# Patient Record
Sex: FEMALE | Race: WHITE | NOT HISPANIC OR LATINO | Employment: OTHER | URBAN - METROPOLITAN AREA
[De-identification: names, ages, dates, MRNs, and addresses within clinical notes are randomized per-mention and may not be internally consistent; named-entity substitution may affect disease eponyms.]

---

## 2023-04-10 ENCOUNTER — HOSPITAL ENCOUNTER (OUTPATIENT)
Facility: HOSPITAL | Age: 68
Discharge: HOME | End: 2023-04-13
Attending: EMERGENCY MEDICINE | Admitting: STUDENT IN AN ORGANIZED HEALTH CARE EDUCATION/TRAINING PROGRAM
Payer: MEDICARE

## 2023-04-10 DIAGNOSIS — R07.9 CHEST PAIN: ICD-10-CM

## 2023-04-10 DIAGNOSIS — G45.9 TIA (TRANSIENT ISCHEMIC ATTACK): Primary | ICD-10-CM

## 2023-04-10 DIAGNOSIS — R47.01 APHASIA: ICD-10-CM

## 2023-04-10 LAB
ALBUMIN SERPL-MCNC: 3.7 G/DL (ref 3.4–4.8)
ALBUMIN/GLOB SERPL: 1.2 RATIO (ref 1.1–2)
ALP SERPL-CCNC: 74 UNIT/L (ref 40–150)
ALT SERPL-CCNC: 10 UNIT/L (ref 0–55)
APPEARANCE UR: CLEAR
APTT PPP: <20 SECONDS (ref 23.2–33.7)
AST SERPL-CCNC: 10 UNIT/L (ref 5–34)
BACTERIA #/AREA URNS AUTO: ABNORMAL /HPF
BASOPHILS # BLD AUTO: 0.03 X10(3)/MCL (ref 0–0.2)
BASOPHILS NFR BLD AUTO: 0.3 %
BILIRUB UR QL STRIP.AUTO: NEGATIVE MG/DL
BILIRUBIN DIRECT+TOT PNL SERPL-MCNC: 0.5 MG/DL
BUN SERPL-MCNC: 19 MG/DL (ref 9.8–20.1)
CALCIUM SERPL-MCNC: 9.4 MG/DL (ref 8.4–10.2)
CHLORIDE SERPL-SCNC: 101 MMOL/L (ref 98–107)
CO2 SERPL-SCNC: 26 MMOL/L (ref 23–31)
COLOR UR AUTO: YELLOW
CREAT SERPL-MCNC: 1.01 MG/DL (ref 0.55–1.02)
EOSINOPHIL # BLD AUTO: 0.35 X10(3)/MCL (ref 0–0.9)
EOSINOPHIL NFR BLD AUTO: 3.5 %
ERYTHROCYTE [DISTWIDTH] IN BLOOD BY AUTOMATED COUNT: 12.9 % (ref 11.5–17)
GFR SERPLBLD CREATININE-BSD FMLA CKD-EPI: 60 MLS/MIN/1.73/M2
GLOBULIN SER-MCNC: 3.2 GM/DL (ref 2.4–3.5)
GLUCOSE SERPL-MCNC: 385 MG/DL (ref 82–115)
GLUCOSE UR QL STRIP.AUTO: >=1000 MG/DL
HCT VFR BLD AUTO: 41 % (ref 37–47)
HGB BLD-MCNC: 13.4 G/DL (ref 12–16)
IMM GRANULOCYTES # BLD AUTO: 0.03 X10(3)/MCL (ref 0–0.04)
IMM GRANULOCYTES NFR BLD AUTO: 0.3 %
INR BLD: 0.98 (ref 0–1.3)
KETONES UR QL STRIP.AUTO: 15 MG/DL
LEUKOCYTE ESTERASE UR QL STRIP.AUTO: NEGATIVE UNIT/L
LYMPHOCYTES # BLD AUTO: 2.5 X10(3)/MCL (ref 0.6–4.6)
LYMPHOCYTES NFR BLD AUTO: 25.2 %
MCH RBC QN AUTO: 29 PG (ref 27–31)
MCHC RBC AUTO-ENTMCNC: 32.7 G/DL (ref 33–36)
MCV RBC AUTO: 88.7 FL (ref 80–94)
MONOCYTES # BLD AUTO: 0.37 X10(3)/MCL (ref 0.1–1.3)
MONOCYTES NFR BLD AUTO: 3.7 %
NEUTROPHILS # BLD AUTO: 6.64 X10(3)/MCL (ref 2.1–9.2)
NEUTROPHILS NFR BLD AUTO: 67 %
NITRITE UR QL STRIP.AUTO: NEGATIVE
PH UR STRIP.AUTO: 7.5 [PH]
PLATELET # BLD AUTO: 308 X10(3)/MCL (ref 130–400)
PMV BLD AUTO: 8.8 FL (ref 7.4–10.4)
POCT GLUCOSE: 215 MG/DL (ref 70–110)
POCT GLUCOSE: 282 MG/DL (ref 70–110)
POCT GLUCOSE: 317 MG/DL (ref 70–110)
POTASSIUM SERPL-SCNC: 3.5 MMOL/L (ref 3.5–5.1)
PROT SERPL-MCNC: 6.9 GM/DL (ref 5.8–7.6)
PROT UR QL STRIP.AUTO: NEGATIVE MG/DL
PROTHROMBIN TIME: <10 SECONDS (ref 12.5–14.5)
RBC # BLD AUTO: 4.62 X10(6)/MCL (ref 4.2–5.4)
RBC #/AREA URNS AUTO: ABNORMAL /HPF
RBC UR QL AUTO: NEGATIVE UNIT/L
SODIUM SERPL-SCNC: 139 MMOL/L (ref 136–145)
SP GR UR STRIP.AUTO: 1.01
SQUAMOUS #/AREA URNS AUTO: ABNORMAL /HPF
UROBILINOGEN UR STRIP-ACNC: 0.2 MG/DL
WBC # SPEC AUTO: 9.9 X10(3)/MCL (ref 4.5–11.5)
WBC #/AREA URNS AUTO: ABNORMAL /HPF

## 2023-04-10 PROCEDURE — 81003 URINALYSIS AUTO W/O SCOPE: CPT | Performed by: EMERGENCY MEDICINE

## 2023-04-10 PROCEDURE — 94760 N-INVAS EAR/PLS OXIMETRY 1: CPT

## 2023-04-10 PROCEDURE — 80053 COMPREHEN METABOLIC PANEL: CPT | Performed by: EMERGENCY MEDICINE

## 2023-04-10 PROCEDURE — 25000003 PHARM REV CODE 250: Performed by: STUDENT IN AN ORGANIZED HEALTH CARE EDUCATION/TRAINING PROGRAM

## 2023-04-10 PROCEDURE — 85730 THROMBOPLASTIN TIME PARTIAL: CPT | Performed by: EMERGENCY MEDICINE

## 2023-04-10 PROCEDURE — 96372 THER/PROPH/DIAG INJ SC/IM: CPT | Performed by: STUDENT IN AN ORGANIZED HEALTH CARE EDUCATION/TRAINING PROGRAM

## 2023-04-10 PROCEDURE — 81001 URINALYSIS AUTO W/SCOPE: CPT | Performed by: EMERGENCY MEDICINE

## 2023-04-10 PROCEDURE — 93005 ELECTROCARDIOGRAM TRACING: CPT

## 2023-04-10 PROCEDURE — 63600175 PHARM REV CODE 636 W HCPCS: Performed by: STUDENT IN AN ORGANIZED HEALTH CARE EDUCATION/TRAINING PROGRAM

## 2023-04-10 PROCEDURE — 93010 EKG 12-LEAD: ICD-10-PCS | Mod: ,,, | Performed by: INTERNAL MEDICINE

## 2023-04-10 PROCEDURE — 25500020 PHARM REV CODE 255: Performed by: STUDENT IN AN ORGANIZED HEALTH CARE EDUCATION/TRAINING PROGRAM

## 2023-04-10 PROCEDURE — 63600175 PHARM REV CODE 636 W HCPCS: Performed by: EMERGENCY MEDICINE

## 2023-04-10 PROCEDURE — 85025 COMPLETE CBC W/AUTO DIFF WBC: CPT | Performed by: EMERGENCY MEDICINE

## 2023-04-10 PROCEDURE — 82962 GLUCOSE BLOOD TEST: CPT

## 2023-04-10 PROCEDURE — G0378 HOSPITAL OBSERVATION PER HR: HCPCS

## 2023-04-10 PROCEDURE — 96375 TX/PRO/DX INJ NEW DRUG ADDON: CPT

## 2023-04-10 PROCEDURE — 99285 EMERGENCY DEPT VISIT HI MDM: CPT | Mod: 25

## 2023-04-10 PROCEDURE — 93010 ELECTROCARDIOGRAM REPORT: CPT | Mod: ,,, | Performed by: INTERNAL MEDICINE

## 2023-04-10 PROCEDURE — 85610 PROTHROMBIN TIME: CPT | Performed by: EMERGENCY MEDICINE

## 2023-04-10 PROCEDURE — 25000003 PHARM REV CODE 250: Performed by: EMERGENCY MEDICINE

## 2023-04-10 PROCEDURE — 96374 THER/PROPH/DIAG INJ IV PUSH: CPT

## 2023-04-10 RX ORDER — SIMETHICONE 80 MG
1 TABLET,CHEWABLE ORAL 4 TIMES DAILY PRN
Status: DISCONTINUED | OUTPATIENT
Start: 2023-04-10 | End: 2023-04-13 | Stop reason: HOSPADM

## 2023-04-10 RX ORDER — ENOXAPARIN SODIUM 100 MG/ML
40 INJECTION SUBCUTANEOUS EVERY 24 HOURS
Status: DISCONTINUED | OUTPATIENT
Start: 2023-04-10 | End: 2023-04-13 | Stop reason: HOSPADM

## 2023-04-10 RX ORDER — SODIUM CHLORIDE 0.9 % (FLUSH) 0.9 %
10 SYRINGE (ML) INJECTION
Status: DISCONTINUED | OUTPATIENT
Start: 2023-04-10 | End: 2023-04-13 | Stop reason: HOSPADM

## 2023-04-10 RX ORDER — MAG HYDROX/ALUMINUM HYD/SIMETH 200-200-20
30 SUSPENSION, ORAL (FINAL DOSE FORM) ORAL 4 TIMES DAILY PRN
Status: DISCONTINUED | OUTPATIENT
Start: 2023-04-10 | End: 2023-04-13 | Stop reason: HOSPADM

## 2023-04-10 RX ORDER — NALOXONE HCL 0.4 MG/ML
0.02 VIAL (ML) INJECTION
Status: DISCONTINUED | OUTPATIENT
Start: 2023-04-10 | End: 2023-04-13 | Stop reason: HOSPADM

## 2023-04-10 RX ORDER — LINAGLIPTIN 5 MG/1
5 TABLET, FILM COATED ORAL DAILY
COMMUNITY
Start: 2023-03-08

## 2023-04-10 RX ORDER — AMLODIPINE BESYLATE 5 MG/1
5 TABLET ORAL DAILY
COMMUNITY
Start: 2023-03-08

## 2023-04-10 RX ORDER — HYDROCHLOROTHIAZIDE 12.5 MG/1
12.5 TABLET ORAL EVERY MORNING
COMMUNITY
Start: 2023-03-08

## 2023-04-10 RX ORDER — CETIRIZINE HYDROCHLORIDE 10 MG/1
10 TABLET ORAL DAILY
COMMUNITY
Start: 2023-01-11

## 2023-04-10 RX ORDER — PRAVASTATIN SODIUM 40 MG/1
40 TABLET ORAL DAILY
Status: ON HOLD | COMMUNITY
Start: 2023-03-08 | End: 2023-04-13 | Stop reason: HOSPADM

## 2023-04-10 RX ORDER — METOPROLOL TARTRATE 1 MG/ML
5 INJECTION, SOLUTION INTRAVENOUS
Status: COMPLETED | OUTPATIENT
Start: 2023-04-10 | End: 2023-04-10

## 2023-04-10 RX ORDER — ONDANSETRON 2 MG/ML
4 INJECTION INTRAMUSCULAR; INTRAVENOUS
Status: COMPLETED | OUTPATIENT
Start: 2023-04-10 | End: 2023-04-10

## 2023-04-10 RX ORDER — INSULIN ASPART 100 [IU]/ML
0-5 INJECTION, SOLUTION INTRAVENOUS; SUBCUTANEOUS
Status: DISCONTINUED | OUTPATIENT
Start: 2023-04-10 | End: 2023-04-13 | Stop reason: HOSPADM

## 2023-04-10 RX ORDER — ONDANSETRON 4 MG/1
8 TABLET, ORALLY DISINTEGRATING ORAL EVERY 8 HOURS PRN
Status: DISCONTINUED | OUTPATIENT
Start: 2023-04-10 | End: 2023-04-13 | Stop reason: HOSPADM

## 2023-04-10 RX ORDER — GLUCAGON 1 MG
1 KIT INJECTION
Status: DISCONTINUED | OUTPATIENT
Start: 2023-04-10 | End: 2023-04-10

## 2023-04-10 RX ORDER — PRAVASTATIN SODIUM 40 MG/1
40 TABLET ORAL DAILY
Status: DISCONTINUED | OUTPATIENT
Start: 2023-04-10 | End: 2023-04-11

## 2023-04-10 RX ORDER — BISACODYL 10 MG
10 SUPPOSITORY, RECTAL RECTAL DAILY PRN
Status: DISCONTINUED | OUTPATIENT
Start: 2023-04-10 | End: 2023-04-13 | Stop reason: HOSPADM

## 2023-04-10 RX ORDER — ASPIRIN 81 MG/1
81 TABLET ORAL DAILY
Status: DISCONTINUED | OUTPATIENT
Start: 2023-04-11 | End: 2023-04-13 | Stop reason: HOSPADM

## 2023-04-10 RX ORDER — CETIRIZINE HYDROCHLORIDE 10 MG/1
10 TABLET ORAL DAILY
Status: DISCONTINUED | OUTPATIENT
Start: 2023-04-10 | End: 2023-04-13 | Stop reason: HOSPADM

## 2023-04-10 RX ORDER — MORPHINE SULFATE 4 MG/ML
2 INJECTION, SOLUTION INTRAMUSCULAR; INTRAVENOUS EVERY 6 HOURS PRN
Status: DISCONTINUED | OUTPATIENT
Start: 2023-04-10 | End: 2023-04-13 | Stop reason: HOSPADM

## 2023-04-10 RX ORDER — IBUPROFEN 200 MG
16 TABLET ORAL
Status: DISCONTINUED | OUTPATIENT
Start: 2023-04-10 | End: 2023-04-13 | Stop reason: HOSPADM

## 2023-04-10 RX ORDER — ONDANSETRON 2 MG/ML
4 INJECTION INTRAMUSCULAR; INTRAVENOUS EVERY 8 HOURS PRN
Status: DISCONTINUED | OUTPATIENT
Start: 2023-04-10 | End: 2023-04-13 | Stop reason: HOSPADM

## 2023-04-10 RX ORDER — METOPROLOL SUCCINATE 50 MG/1
100 TABLET, EXTENDED RELEASE ORAL 2 TIMES DAILY
Status: DISCONTINUED | OUTPATIENT
Start: 2023-04-10 | End: 2023-04-13 | Stop reason: HOSPADM

## 2023-04-10 RX ORDER — METOPROLOL SUCCINATE 100 MG/1
100 TABLET, EXTENDED RELEASE ORAL 2 TIMES DAILY
COMMUNITY
Start: 2023-03-08

## 2023-04-10 RX ORDER — TALC
9 POWDER (GRAM) TOPICAL NIGHTLY PRN
Status: DISCONTINUED | OUTPATIENT
Start: 2023-04-10 | End: 2023-04-11

## 2023-04-10 RX ORDER — IBUPROFEN 200 MG
24 TABLET ORAL
Status: DISCONTINUED | OUTPATIENT
Start: 2023-04-10 | End: 2023-04-13 | Stop reason: HOSPADM

## 2023-04-10 RX ORDER — IPRATROPIUM BROMIDE AND ALBUTEROL SULFATE 2.5; .5 MG/3ML; MG/3ML
3 SOLUTION RESPIRATORY (INHALATION) EVERY 6 HOURS PRN
Status: DISCONTINUED | OUTPATIENT
Start: 2023-04-10 | End: 2023-04-13 | Stop reason: HOSPADM

## 2023-04-10 RX ORDER — LABETALOL HCL 20 MG/4 ML
10 SYRINGE (ML) INTRAVENOUS 4 TIMES DAILY PRN
Status: DISCONTINUED | OUTPATIENT
Start: 2023-04-10 | End: 2023-04-13 | Stop reason: HOSPADM

## 2023-04-10 RX ORDER — AMLODIPINE BESYLATE 5 MG/1
5 TABLET ORAL DAILY
Status: DISCONTINUED | OUTPATIENT
Start: 2023-04-11 | End: 2023-04-11

## 2023-04-10 RX ORDER — GLIPIZIDE 10 MG/1
10 TABLET ORAL 2 TIMES DAILY
Status: DISCONTINUED | OUTPATIENT
Start: 2023-04-10 | End: 2023-04-13 | Stop reason: HOSPADM

## 2023-04-10 RX ORDER — POLYETHYLENE GLYCOL 3350 17 G/17G
17 POWDER, FOR SOLUTION ORAL 3 TIMES DAILY PRN
Status: DISCONTINUED | OUTPATIENT
Start: 2023-04-10 | End: 2023-04-13 | Stop reason: HOSPADM

## 2023-04-10 RX ORDER — GLIPIZIDE 10 MG/1
10 TABLET ORAL 2 TIMES DAILY
COMMUNITY
Start: 2023-03-08

## 2023-04-10 RX ORDER — HYDROCODONE BITARTRATE AND ACETAMINOPHEN 5; 325 MG/1; MG/1
1 TABLET ORAL EVERY 6 HOURS PRN
Status: DISCONTINUED | OUTPATIENT
Start: 2023-04-10 | End: 2023-04-13 | Stop reason: HOSPADM

## 2023-04-10 RX ORDER — HYDROCHLOROTHIAZIDE 12.5 MG/1
12.5 TABLET ORAL EVERY MORNING
Status: DISCONTINUED | OUTPATIENT
Start: 2023-04-11 | End: 2023-04-12

## 2023-04-10 RX ORDER — ACETAMINOPHEN 325 MG/1
650 TABLET ORAL EVERY 4 HOURS PRN
Status: DISCONTINUED | OUTPATIENT
Start: 2023-04-10 | End: 2023-04-13 | Stop reason: HOSPADM

## 2023-04-10 RX ORDER — AMLODIPINE BESYLATE 5 MG/1
5 TABLET ORAL DAILY
Status: DISCONTINUED | OUTPATIENT
Start: 2023-04-10 | End: 2023-04-10

## 2023-04-10 RX ORDER — LISINOPRIL 2.5 MG/1
5 TABLET ORAL DAILY
Status: DISCONTINUED | OUTPATIENT
Start: 2023-04-10 | End: 2023-04-13 | Stop reason: HOSPADM

## 2023-04-10 RX ORDER — LISINOPRIL 5 MG/1
5 TABLET ORAL DAILY
COMMUNITY
Start: 2023-03-08

## 2023-04-10 RX ADMIN — ENOXAPARIN SODIUM 40 MG: 40 INJECTION SUBCUTANEOUS at 05:04

## 2023-04-10 RX ADMIN — GLIPIZIDE 10 MG: 10 TABLET ORAL at 08:04

## 2023-04-10 RX ADMIN — METOPROLOL TARTRATE 5 MG: 1 INJECTION, SOLUTION INTRAVENOUS at 09:04

## 2023-04-10 RX ADMIN — INSULIN ASPART 3 UNITS: 100 INJECTION, SOLUTION INTRAVENOUS; SUBCUTANEOUS at 05:04

## 2023-04-10 RX ADMIN — LISINOPRIL 5 MG: 2.5 TABLET ORAL at 05:04

## 2023-04-10 RX ADMIN — IOPAMIDOL 100 ML: 755 INJECTION, SOLUTION INTRAVENOUS at 10:04

## 2023-04-10 RX ADMIN — CETIRIZINE HYDROCHLORIDE 10 MG: 10 TABLET, FILM COATED ORAL at 05:04

## 2023-04-10 RX ADMIN — METOPROLOL SUCCINATE 100 MG: 50 TABLET, FILM COATED, EXTENDED RELEASE ORAL at 08:04

## 2023-04-10 RX ADMIN — PRAVASTATIN SODIUM 40 MG: 40 TABLET ORAL at 05:04

## 2023-04-10 RX ADMIN — ONDANSETRON HYDROCHLORIDE 4 MG: 2 SOLUTION INTRAMUSCULAR; INTRAVENOUS at 08:04

## 2023-04-10 NOTE — ED PROVIDER NOTES
Encounter Date: 4/10/2023       History     Chief Complaint   Patient presents with    Fatigue     Expressive aphasia this since last night  L[facility]  9pm  4/9/23     This 68-year-old female was brought to the emergency room after she awoke and found that she was having difficulty speaking.  She states she was normal last night when she went to bed.  She also reports that she had a few episodes of dry heaving.     Review of patient's allergies indicates:  No Known Allergies  Past Medical History:   Diagnosis Date    Diabetes mellitus     Gout, unspecified     Hypertension     Unspecified injury of head, initial encounter      No past surgical history on file.  No family history on file.  Social History     Substance Use Topics    Alcohol use: Never     Review of Systems   Constitutional:  Negative for fever.   HENT:  Negative for sore throat.    Respiratory:  Negative for shortness of breath.    Cardiovascular:  Negative for chest pain.   Gastrointestinal:  Negative for nausea.   Genitourinary:  Negative for dysuria.   Musculoskeletal:  Negative for back pain.   Skin:  Negative for rash.   Neurological:  Positive for speech difficulty. Negative for dizziness, weakness, numbness and headaches.   Hematological:  Does not bruise/bleed easily.     Physical Exam     Initial Vitals   BP Pulse Resp Temp SpO2   04/10/23 0810 04/10/23 0810 04/10/23 0810 04/10/23 0821 04/10/23 0810   (!) 129/90 63 20 97.9 °F (36.6 °C) 98 %      MAP       --                Physical Exam    Nursing note and vitals reviewed.  Constitutional: She appears well-developed and well-nourished.   HENT:   Head: Normocephalic and atraumatic.   Eyes: Conjunctivae and EOM are normal. Pupils are equal, round, and reactive to light.   Neck: Neck supple.   Normal range of motion.  Cardiovascular:  Normal rate, regular rhythm, normal heart sounds and intact distal pulses.           Pulmonary/Chest: Breath sounds normal.   Abdominal: Abdomen is soft. Bowel sounds  are normal.   Musculoskeletal:         General: Normal range of motion.      Cervical back: Normal range of motion and neck supple.     Neurological: She is alert and oriented to person, place, and time. She has normal strength.   Mild expressive aphasia   Skin: Skin is warm and dry. Capillary refill takes less than 2 seconds.   Psychiatric: She has a normal mood and affect. Her behavior is normal. Judgment and thought content normal.       ED Course   Procedures  Labs Reviewed   COMPREHENSIVE METABOLIC PANEL - Abnormal; Notable for the following components:       Result Value    Glucose Level 385 (*)     All other components within normal limits   URINALYSIS, REFLEX TO URINE CULTURE - Abnormal; Notable for the following components:    Glucose, UA >=1000 (*)     Ketones, UA 15 (*)     All other components within normal limits   CBC WITH DIFFERENTIAL - Abnormal; Notable for the following components:    MCHC 32.7 (*)     All other components within normal limits   PROTIME-INR - Abnormal; Notable for the following components:    PT <10.0 (*)     All other components within normal limits   APTT - Abnormal; Notable for the following components:    PTT <20.0 (*)     All other components within normal limits   CBC W/ AUTO DIFFERENTIAL    Narrative:     The following orders were created for panel order CBC auto differential.  Procedure                               Abnormality         Status                     ---------                               -----------         ------                     CBC with Differential[[id number]]        Abnormal            Final result                 Please view results for these tests on the individual orders.   URINALYSIS, MICROSCOPIC          Imaging Results              CT Head Without Contrast (Final result)  Result time 04/10/23 08:20:44      Final result by Juan Meza MD (04/10/23 08:20:44)                   Impression:      No acute intracranial findings.      Electronically  signed by: Juan Meza MD  Date:    04/10/2023  Time:    08:20               Narrative:    EXAMINATION:  CT HEAD WITHOUT CONTRAST    CLINICAL HISTORY:  Dizziness, slurred speech    TECHNIQUE:  Axial CT images were obtained through the head without contrast.  Coronal and sagittal reformations were also performed.  Total DLP 1489.  Automated exposure control utilized.    COMPARISON:  None.    FINDINGS:  No hemorrhage, mass effect or CT evidence of acute cortical infarction.  White-matter hypodensities are nonspecific but likely related to small vessel ischemic disease.  Ventricles and sulci are proportionate.    No abnormal extra-axial fluid collections.    No hyperdense artery or vein.  Intracranial arteries are partially calcified.    No skull fracture or aggressive osseous process.  Visualized paranasal sinuses and mastoid air cells are clear.                                       Medications   ondansetron injection 4 mg (4 mg Intravenous Given 4/10/23 0851)     Medical Decision Making:   Initial Assessment:   69 y/o female awoke to find that she  was having difficulty speaking. She denies any other problems. She has a h/o TBI with speech problems but they had resolved until now. She was last seen normal at 9 PM last night.   Differential Diagnosis:   CVA  Clinical Tests:   Radiological Study: Ordered and Reviewed  ED Management:  As patients time of onset is unknown and her aphasia is mild do not think that TPA is indicated. On return from CT the patients speech has already improved.                        Clinical Impression:   Final diagnoses:  [R47.01] Aphasia  [G45.9] TIA (transient ischemic attack) (Primary)        ED Disposition Condition    Observation Stable                Matteo Hartman MD  04/10/23 0902

## 2023-04-10 NOTE — ED NOTES
MRI notified patient needs MRI as soon as possible. MRI reports patient will not be able to perform test until after lunch. Dr. Hartman notified

## 2023-04-10 NOTE — H&P
[facility]   H&P ADMISSION NOTE      Patient Name: Yris Dunlap  MRN: [id number]  Patient Class: OP- Observation   Admission Date: 4/10/2023   Admitting Physician: VALARIE Service   Attending Physician: Thairy G Reyes, DO  Primary Care Provider: No primary care provider on file.  Face-to-Face encounter date: 04/10/2023     CHIEF COMPLAINT     Chief Complaint   Patient presents with    Fatigue     Expressive aphasia this since last night  LWK  9pm  4/9/23     HISTORY OF PRESENTING ILLNESS   68-year-old female with past medical history of traumatic brain injury, hypertension, diabetes mellitus, hyperlipidemia, gout who presents with complaint of dysarthria this morning.  Family at bedside reports last known normal was last night before she went to bed.  When she woke up patient experienced dizziness, vomiting and marked dysarthria.  At baseline family reports she speaks normally however today she has a staggered speech pattern.  Reportedly per ED physician Neurology was consulted and reported patient could remain at our facility if no large vessel occlusion which was ruled out by CTA.  MRI did not show evidence of acute ischemia.  Dysarthria remains.    At baseline patient is independent for ADLs  PAST MEDICAL HISTORY     Past Medical History:   Diagnosis Date    Diabetes mellitus     Gout, unspecified     Hypertension     Unspecified injury of head, initial encounter        PAST SURGICAL HISTORY   No past surgical history on file.    FAMILY HISTORY   Reviewed and noncontributory to this case    SOCIAL HISTORY     Social History     Socioeconomic History    Marital status: Single   Substance and Sexual Activity    Alcohol use: Never       HOME MEDICATIONS     Prior to Admission medications    Medication Sig Start Date End Date Taking? Authorizing Provider   amLODIPine (NORVASC) 5 MG tablet Take 5 mg by mouth Daily. 3/8/23  Yes Historical Provider   cetirizine (ZYRTEC) 10  MG tablet Take 10 mg by mouth Daily. 1/11/23  Yes Historical Provider   glipiZIDE (GLUCOTROL) 10 MG tablet Take 10 mg by mouth 2 (two) times daily. 3/8/23  Yes Historical Provider   hydroCHLOROthiazide (HYDRODIURIL) 12.5 MG Tab Take 12.5 mg by mouth every morning. 3/8/23  Yes Historical Provider   lisinopriL (PRINIVIL,ZESTRIL) 5 MG tablet Take 5 mg by mouth Daily. 3/8/23  Yes Historical Provider   metoprolol succinate (TOPROL-XL) 100 MG 24 hr tablet Take 100 mg by mouth 2 (two) times daily. 3/8/23  Yes Historical Provider   pravastatin (PRAVACHOL) 40 MG tablet Take 40 mg by mouth Daily. 3/8/23  Yes Historical Provider   TRADJENTA 5 mg Tab tablet Take 5 mg by mouth Daily. 3/8/23  Yes Historical Provider       ALLERGIES   Patient has no known allergies.    REVIEW OF SYSTEMS   Except as documented above, all other systems reviewed and negative    PHYSICAL EXAM     Vitals:    04/10/23 1226   BP: (!) 152/75   Pulse: 80   Resp: 18   Temp: 97.3 °F (36.3 °C)      General:  In no acute distress, resting comfortably  Head and neck:  Atraumatic, normocephalic, moist mucous membranes, supple neck, poor dentition  Chest:  Clear to auscultation bilaterally  Heart:  S1, S2, no appreciable murmur  Abdomen:  Soft, nontender, BS +  MSK:  Warm, no lower extremity edema, no clubbing or cyanosis  Neuro:  Alert and oriented x4, right pronator drift, dysdiadochokinesia, dysarthria  Integumentary:  No obvious skin rash  Psychiatry:  Appropriate mood and affect    ASSESSMENT AND PLAN   TIA  -CT head showed no acute intracranial findings  -CTA showed no high-grade stenosis or major vessel occlusion  -MRI showed evidence of chronic ischemia at the left kylah, chronic microvascular ischemia  -will maintain a permissive hypertension until tomorrow morning and resume her home medications at that point in time  -pending A1c, lipid panel  -patient would benefit from stricter blood glucose control as she was hyperglycemic on admission as high as  386  -PT/OT/ST    Hypertension  -permissive hypertension until tomorrow morning  -reviewed patient's home medications, continue with amlodipine, hydrochlorothiazide, lisinopril, metoprolol with unchanged at this time    Non insulin-dependent diabetes mellitus II  -pending A1c   -continue with glipizide, sitagliptin    Hyperlipidemia   -lipid panel in the morning   -continue pravastatin    Gout  -no acute exacerbation at this time    DVT prophylaxis:  Lovenox  Admit observation status under my care  __________________________________________________________________  LABS/MICRO/MEDS/DIAGNOSTICS     LABS  Recent Labs     04/10/23  0815      K 3.5   CHLORIDE 101   CO2 26   BUN 19.0   CREATININE 1.01   GLUCOSE 385*   CALCIUM 9.4   ALKPHOS 74   AST 10   ALT 10   ALBUMIN 3.7     Recent Labs     04/10/23  0815   WBC 9.9   RBC 4.62   HCT 41.0   MCV 88.7          MICROBIOLOGY  Microbiology Results (last 7 days)       ** No results found for the last 168 hours. **            MEDICATIONS   [START ON 4/11/2023] amLODIPine  5 mg Oral Daily    cetirizine  10 mg Oral Daily    enoxaparin  40 mg Subcutaneous Daily    glipiZIDE  10 mg Oral BID    [START ON 4/11/2023] hydroCHLOROthiazide  12.5 mg Oral QAM    lisinopriL  5 mg Oral Daily    metoprolol succinate  100 mg Oral BID    pravastatin  40 mg Oral Daily    [START ON 4/11/2023] SITagliptin phosphate  100 mg Oral Daily      INFUSIONS      DIAGNOSTIC TESTS  MRI Brain Without Contrast   Final Result      No definite acute intracranial abnormality.  There is a geographic region of T2 shine through left kylah consistent with prior ischemia.  This finding is new compared to the prior exam from 2016.      Moderate chronic microvascular white matter ischemic change         Electronically signed by: Xander Lang MD   Date:    04/10/2023   Time:    14:00      CTA Head and Neck (xpd)   Final Result      No acute abnormality. No high-grade stenosis or major vessel occlusion.          Electronically signed by: Juan Meza MD   Date:    04/10/2023   Time:    11:45      CT Head Without Contrast   Final Result      No acute intracranial findings.         Electronically signed by: Juan Meza MD   Date:    04/10/2023   Time:    08:20      US Carotid Bilateral    (Results Pending)          Patient information was obtained from patient, patient's family, past medical records and ER records.   All diagnosis and differential diagnosis have been reviewed; assessment and plan has been documented. I have personally reviewed the labs and test results that are presently available; I have reviewed the patients medication list. I have reviewed the consulting providers response and recommendations. I have reviewed or attempted to review medical records based upon their availability.  All of the patient's questions have been addressed and answered. Patient's is agreeable to the above stated plan. I will continue to monitor closely and make adjustments to medical management as needed.  This note was created using [employer] voice recognition software that occasionally misinterpreted phrases or words.  Please contact me if any questions may rise regarding documentation to clarify verbiage.        Thairy G Reyes, DO   04/10/2023   Internal Medicine

## 2023-04-10 NOTE — ED NOTES
The daughter is reporting her speech is getting slurred again. Dr. Hartman notified and speaking with patient.

## 2023-04-11 LAB
ANION GAP SERPL CALC-SCNC: 13 MEQ/L
AV MEAN GRADIENT: 5 MMHG
AV PEAK GRADIENT: 10 MMHG
AV VELOCITY RATIO: 0.81
BASOPHILS # BLD AUTO: 0.03 X10(3)/MCL (ref 0–0.2)
BASOPHILS NFR BLD AUTO: 0.2 %
BSA FOR ECHO PROCEDURE: 1.88 M2
BUN SERPL-MCNC: 19 MG/DL (ref 9.8–20.1)
CALCIUM SERPL-MCNC: 9.5 MG/DL (ref 8.4–10.2)
CHLORIDE SERPL-SCNC: 102 MMOL/L (ref 98–107)
CHOLEST SERPL-MCNC: 206 MG/DL
CHOLEST/HDLC SERPL: 6 {RATIO} (ref 0–5)
CO2 SERPL-SCNC: 28 MMOL/L (ref 23–31)
CREAT SERPL-MCNC: 0.84 MG/DL (ref 0.55–1.02)
CREAT/UREA NIT SERPL: 23
CV ECHO LV RWT: 0.38 CM
DOP CALC AO PEAK VEL: 1.6 M/S
DOP CALC AO VTI: 30.3 CM
DOP CALC LVOT AREA: 3 CM2
DOP CALC LVOT DIAMETER: 1.95 CM
DOP CALC LVOT PEAK VEL: 1.3 M/S
E/A RATIO: 0.7
ECHO LV POSTERIOR WALL: 0.9 CM (ref 0.6–1.1)
EJECTION FRACTION: 70 %
EOSINOPHIL # BLD AUTO: 0.02 X10(3)/MCL (ref 0–0.9)
EOSINOPHIL NFR BLD AUTO: 0.2 %
ERYTHROCYTE [DISTWIDTH] IN BLOOD BY AUTOMATED COUNT: 12.8 % (ref 11.5–17)
EST. AVERAGE GLUCOSE BLD GHB EST-MCNC: 197.3 MG/DL
FRACTIONAL SHORTENING: 44 % (ref 28–44)
GFR SERPLBLD CREATININE-BSD FMLA CKD-EPI: >60 MLS/MIN/1.73/M2
GLUCOSE SERPL-MCNC: 226 MG/DL (ref 82–115)
HBA1C MFR BLD: 8.5 %
HCT VFR BLD AUTO: 40.6 % (ref 37–47)
HDLC SERPL-MCNC: 36 MG/DL (ref 35–60)
HGB BLD-MCNC: 13.6 G/DL (ref 12–16)
IMM GRANULOCYTES # BLD AUTO: 0.02 X10(3)/MCL (ref 0–0.04)
IMM GRANULOCYTES NFR BLD AUTO: 0.2 %
INTERVENTRICULAR SEPTUM: 1.1 CM (ref 0.6–1.1)
LDLC SERPL CALC-MCNC: 115 MG/DL (ref 50–140)
LEFT ATRIUM SIZE: 4 CM
LEFT INTERNAL DIMENSION IN SYSTOLE: 2.7 CM (ref 2.1–4)
LEFT VENTRICLE MASS INDEX: 95 G/M2
LEFT VENTRICULAR INTERNAL DIMENSION IN DIASTOLE: 4.8 CM (ref 3.5–6)
LEFT VENTRICULAR MASS: 170.19 G
LVOT MG: 3.6 MMHG
LVOT MV: 3.6 CM/S
LYMPHOCYTES # BLD AUTO: 2.74 X10(3)/MCL (ref 0.6–4.6)
LYMPHOCYTES NFR BLD AUTO: 22.5 %
MCH RBC QN AUTO: 29.6 PG (ref 27–31)
MCHC RBC AUTO-ENTMCNC: 33.5 G/DL (ref 33–36)
MCV RBC AUTO: 88.5 FL (ref 80–94)
MONOCYTES # BLD AUTO: 0.66 X10(3)/MCL (ref 0.1–1.3)
MONOCYTES NFR BLD AUTO: 5.4 %
MV PEAK A VEL: 1.37 M/S
MV PEAK E VEL: 0.96 M/S
NEUTROPHILS # BLD AUTO: 8.71 X10(3)/MCL (ref 2.1–9.2)
NEUTROPHILS NFR BLD AUTO: 71.5 %
PISA TR MAX VEL: 2.78 M/S
PLATELET # BLD AUTO: 346 X10(3)/MCL (ref 130–400)
PMV BLD AUTO: 9.3 FL (ref 7.4–10.4)
POCT GLUCOSE: 218 MG/DL (ref 70–110)
POCT GLUCOSE: 231 MG/DL (ref 70–110)
POCT GLUCOSE: 280 MG/DL (ref 70–110)
POCT GLUCOSE: 308 MG/DL (ref 70–110)
POCT GLUCOSE: 352 MG/DL (ref 70–110)
POTASSIUM SERPL-SCNC: 3.3 MMOL/L (ref 3.5–5.1)
RBC # BLD AUTO: 4.59 X10(6)/MCL (ref 4.2–5.4)
RIGHT VENTRICULAR END-DIASTOLIC DIMENSION: 2.1 CM
SODIUM SERPL-SCNC: 143 MMOL/L (ref 136–145)
TR MAX PG: 31 MMHG
TRIGL SERPL-MCNC: 277 MG/DL (ref 37–140)
VLDLC SERPL CALC-MCNC: 55 MG/DL
WBC # SPEC AUTO: 12.2 X10(3)/MCL (ref 4.5–11.5)

## 2023-04-11 PROCEDURE — 97530 THERAPEUTIC ACTIVITIES: CPT

## 2023-04-11 PROCEDURE — 85025 COMPLETE CBC W/AUTO DIFF WBC: CPT | Performed by: STUDENT IN AN ORGANIZED HEALTH CARE EDUCATION/TRAINING PROGRAM

## 2023-04-11 PROCEDURE — 83036 HEMOGLOBIN GLYCOSYLATED A1C: CPT | Performed by: STUDENT IN AN ORGANIZED HEALTH CARE EDUCATION/TRAINING PROGRAM

## 2023-04-11 PROCEDURE — G0378 HOSPITAL OBSERVATION PER HR: HCPCS

## 2023-04-11 PROCEDURE — 25000003 PHARM REV CODE 250: Performed by: STUDENT IN AN ORGANIZED HEALTH CARE EDUCATION/TRAINING PROGRAM

## 2023-04-11 PROCEDURE — 80048 BASIC METABOLIC PNL TOTAL CA: CPT | Performed by: STUDENT IN AN ORGANIZED HEALTH CARE EDUCATION/TRAINING PROGRAM

## 2023-04-11 PROCEDURE — 51701 INSERT BLADDER CATHETER: CPT

## 2023-04-11 PROCEDURE — 92523 SPEECH SOUND LANG COMPREHEN: CPT

## 2023-04-11 PROCEDURE — 97116 GAIT TRAINING THERAPY: CPT

## 2023-04-11 PROCEDURE — 80061 LIPID PANEL: CPT | Performed by: STUDENT IN AN ORGANIZED HEALTH CARE EDUCATION/TRAINING PROGRAM

## 2023-04-11 PROCEDURE — 94760 N-INVAS EAR/PLS OXIMETRY 1: CPT

## 2023-04-11 PROCEDURE — 92610 EVALUATE SWALLOWING FUNCTION: CPT

## 2023-04-11 PROCEDURE — 25000003 PHARM REV CODE 250: Performed by: INTERNAL MEDICINE

## 2023-04-11 PROCEDURE — 97162 PT EVAL MOD COMPLEX 30 MIN: CPT

## 2023-04-11 PROCEDURE — 96372 THER/PROPH/DIAG INJ SC/IM: CPT | Performed by: STUDENT IN AN ORGANIZED HEALTH CARE EDUCATION/TRAINING PROGRAM

## 2023-04-11 PROCEDURE — 97166 OT EVAL MOD COMPLEX 45 MIN: CPT

## 2023-04-11 PROCEDURE — 63600175 PHARM REV CODE 636 W HCPCS: Performed by: STUDENT IN AN ORGANIZED HEALTH CARE EDUCATION/TRAINING PROGRAM

## 2023-04-11 RX ORDER — CLOPIDOGREL BISULFATE 75 MG/1
75 TABLET ORAL DAILY
Status: DISCONTINUED | OUTPATIENT
Start: 2023-04-11 | End: 2023-04-13 | Stop reason: HOSPADM

## 2023-04-11 RX ORDER — ZOLPIDEM TARTRATE 5 MG/1
5 TABLET ORAL NIGHTLY PRN
Status: DISCONTINUED | OUTPATIENT
Start: 2023-04-12 | End: 2023-04-13 | Stop reason: HOSPADM

## 2023-04-11 RX ORDER — QUETIAPINE FUMARATE 50 MG/1
50 TABLET, FILM COATED ORAL NIGHTLY
Status: DISCONTINUED | OUTPATIENT
Start: 2023-04-11 | End: 2023-04-13 | Stop reason: HOSPADM

## 2023-04-11 RX ORDER — POTASSIUM CHLORIDE 20 MEQ/1
40 TABLET, EXTENDED RELEASE ORAL ONCE
Status: COMPLETED | OUTPATIENT
Start: 2023-04-11 | End: 2023-04-11

## 2023-04-11 RX ORDER — TALC
6 POWDER (GRAM) TOPICAL NIGHTLY
Status: DISCONTINUED | OUTPATIENT
Start: 2023-04-11 | End: 2023-04-13 | Stop reason: HOSPADM

## 2023-04-11 RX ORDER — SERTRALINE HYDROCHLORIDE 25 MG/1
25 TABLET, FILM COATED ORAL DAILY
Status: DISCONTINUED | OUTPATIENT
Start: 2023-04-11 | End: 2023-04-13 | Stop reason: HOSPADM

## 2023-04-11 RX ORDER — ATORVASTATIN CALCIUM 40 MG/1
40 TABLET, FILM COATED ORAL NIGHTLY
Status: DISCONTINUED | OUTPATIENT
Start: 2023-04-11 | End: 2023-04-13 | Stop reason: HOSPADM

## 2023-04-11 RX ADMIN — CLOPIDOGREL BISULFATE 75 MG: 75 TABLET ORAL at 05:04

## 2023-04-11 RX ADMIN — SITAGLIPTIN 100 MG: 50 TABLET, FILM COATED ORAL at 09:04

## 2023-04-11 RX ADMIN — INSULIN ASPART 4 UNITS: 100 INJECTION, SOLUTION INTRAVENOUS; SUBCUTANEOUS at 01:04

## 2023-04-11 RX ADMIN — ATORVASTATIN CALCIUM 40 MG: 40 TABLET, FILM COATED ORAL at 09:04

## 2023-04-11 RX ADMIN — POTASSIUM CHLORIDE 40 MEQ: 1500 TABLET, EXTENDED RELEASE ORAL at 12:04

## 2023-04-11 RX ADMIN — ENOXAPARIN SODIUM 40 MG: 40 INJECTION SUBCUTANEOUS at 05:04

## 2023-04-11 RX ADMIN — METOPROLOL SUCCINATE 100 MG: 50 TABLET, FILM COATED, EXTENDED RELEASE ORAL at 09:04

## 2023-04-11 RX ADMIN — CETIRIZINE HYDROCHLORIDE 10 MG: 10 TABLET, FILM COATED ORAL at 05:04

## 2023-04-11 RX ADMIN — HYDROCHLOROTHIAZIDE 12.5 MG: 12.5 TABLET ORAL at 06:04

## 2023-04-11 RX ADMIN — GLIPIZIDE 10 MG: 10 TABLET ORAL at 09:04

## 2023-04-11 RX ADMIN — INSULIN ASPART 1 UNITS: 100 INJECTION, SOLUTION INTRAVENOUS; SUBCUTANEOUS at 05:04

## 2023-04-11 RX ADMIN — ASPIRIN 81 MG: 81 TABLET, COATED ORAL at 09:04

## 2023-04-11 RX ADMIN — LISINOPRIL 5 MG: 2.5 TABLET ORAL at 05:04

## 2023-04-11 RX ADMIN — QUETIAPINE FUMARATE 50 MG: 50 TABLET ORAL at 11:04

## 2023-04-11 RX ADMIN — SERTRALINE HYDROCHLORIDE 25 MG: 25 TABLET ORAL at 02:04

## 2023-04-11 RX ADMIN — Medication 6 MG: at 09:04

## 2023-04-11 RX ADMIN — INSULIN ASPART 3 UNITS: 100 INJECTION, SOLUTION INTRAVENOUS; SUBCUTANEOUS at 05:04

## 2023-04-11 NOTE — PLAN OF CARE
Problem: Adult Inpatient Plan of Care  Goal: Plan of Care Review  Flowsheets (Taken 4/10/2023 1939)  Plan of Care Reviewed With:   patient   family  Goal: Absence of Hospital-Acquired Illness or Injury  Intervention: Identify and Manage Fall Risk  Flowsheets (Taken 4/10/2023 1939)  Safety Promotion/Fall Prevention:   assistive device/personal item within reach   bed alarm set   medications reviewed   nonskid shoes/socks when out of bed   Fall Risk reviewed with patient/family   family to remain at bedside   instructed to call staff for mobility  Intervention: Prevent Skin Injury  Flowsheets (Taken 4/10/2023 1939)  Body Position:   weight shifting   sitting up in bed   position changed independently  Skin Protection:   adhesive use limited   incontinence pads utilized   tubing/devices free from skin contact  Intervention: Prevent and Manage VTE (Venous Thromboembolism) Risk  Flowsheets (Taken 4/10/2023 1939)  Activity Management:   Ambulated to bathroom - L4   Up to bedside commode - L3   Rolling - L1   Arm raise - L1   Ankle pumps - L1  VTE Prevention/Management:   ambulation promoted   bleeding precations maintained   bleeding risk assessed  Range of Motion: active ROM (range of motion) encouraged  Intervention: Prevent Infection  Flowsheets (Taken 4/10/2023 1939)  Infection Prevention:   cohorting utilized   hand hygiene promoted   single patient room provided     Problem: Fall Injury Risk  Goal: Absence of Fall and Fall-Related Injury  Intervention: Identify and Manage Contributors  Flowsheets (Taken 4/10/2023 1939)  Self-Care Promotion: BADL personal objects within reach  Medication Review/Management: medications reviewed  Intervention: Promote Injury-Free Environment  Flowsheets (Taken 4/10/2023 1939)  Safety Promotion/Fall Prevention:   assistive device/personal item within reach   bed alarm set   medications reviewed   nonskid shoes/socks when out of bed   Fall Risk reviewed with patient/family   family to  remain at bedside   instructed to call staff for mobility

## 2023-04-11 NOTE — PLAN OF CARE
Problem: Adult Inpatient Plan of Care  Goal: Plan of Care Review  Outcome: Ongoing, Progressing  Flowsheets (Taken 4/11/2023 0220)  Plan of Care Reviewed With: patient  Goal: Patient-Specific Goal (Individualized)  Outcome: Ongoing, Progressing  Flowsheets (Taken 4/11/2023 0220)  Anxieties, Fears or Concerns: VERY ANXIOUS AND RESTLESS  Individualized Care Needs: roll belt and monitor frequently until end of shift 7a  Goal: Absence of Hospital-Acquired Illness or Injury  Outcome: Ongoing, Progressing  Intervention: Identify and Manage Fall Risk  Flowsheets (Taken 4/11/2023 0220)  Safety Promotion/Fall Prevention:   assistive device/personal item within reach   bed alarm set   Fall Risk reviewed with patient/family   side rails raised x 3  Intervention: Prevent Skin Injury  Flowsheets (Taken 4/11/2023 0220)  Body Position: position changed independently  Skin Protection:   adhesive use limited   incontinence pads utilized  Intervention: Prevent and Manage VTE (Venous Thromboembolism) Risk  Flowsheets (Taken 4/11/2023 0220)  Activity Management: Ambulated to bathroom - L4  VTE Prevention/Management:   bleeding precations maintained   bleeding risk assessed  Range of Motion: active ROM (range of motion) encouraged  Intervention: Prevent Infection  Flowsheets (Taken 4/11/2023 0220)  Infection Prevention:   environmental surveillance performed   personal protective equipment utilized   rest/sleep promoted   single patient room provided   equipment surfaces disinfected   hand hygiene promoted   visitors restricted/screened

## 2023-04-11 NOTE — PROGRESS NOTES
Inpatient Nutrition Evaluation    Admit Date: 4/10/2023   Total duration of encounter: 1 day    Nutrition Recommendation/Prescription     Continue easy to chew diet. 2. Adjust diet to sugar free drinks.       Nutrition Assessment     Chart Review    Reason Seen: continuous nutrition monitoring, length of stay, and follow-up    Malnutrition Screening Tool Results   Have you recently lost weight without trying?: No  Have you been eating poorly because of a decreased appetite?: No   MST Score: 0     Diagnosis:  TIA    Relevant Medical History: DM, Gout, HTN, unspecified injury  of head initial     Nutrition-Related Medications: glipizide, hydrochlorothiazide, insulin aspart,     Nutrition-Related Labs:     Latest Reference Range & Units 04/11/23 03:11   Sodium 136 - 145 mmol/L 143   Potassium 3.5 - 5.1 mmol/L 3.3 (L)   Chloride 98 - 107 mmol/L 102   CO2 23 - 31 mmol/L 28   Anion Gap mEq/L 13.0   BUN 9.8 - 20.1 mg/dL 19.0   Creatinine 0.55 - 1.02 mg/dL 0.84   BUN/CREAT RATIO  23   eGFR mls/min/1.73/m2 >60   Glucose 82 - 115 mg/dL 226 (H)   Calcium 8.4 - 10.2 mg/dL 9.5   Cholesterol <=200 mg/dL 206 (H)   HDL 35 - 60 mg/dL 36   LDL Cholesterol External 50.00 - 140.00 mg/dL 115.00   Total Cholesterol/HDL Ratio 0 - 5  6 (H)   Triglycerides 37 - 140 mg/dL 277 (H)   Very Low Density Lipoprotein  55   Hemoglobin A1C External <=7.0 % 8.5 (H)   (L): Data is abnormally low  (H): Data is abnormally high  Diet Order: Diet Easy to Chew  Oral Supplement Order: none  Appetite/Oral Intake: good/% of meals  Factors Affecting Nutritional Intake: chewing difficulty  Food/Caodaism/Cultural Preferences: unable to obtain  Food Allergies: none reported       Wound(s):       Comments    Pt intake is good. Discussed food preferences. Adjust drinks. Pt has PMHx-DM.   Marked menus.   Anthropometrics    Height: 5' (152.4 cm) Height Method: Stated  Last Weight: 83.4 kg (183 lb 13.8 oz) (04/10/23 1226) Weight Method: Bed Scale  BMI  (Calculated): 35.9  BMI Classification: obese grade II (BMI 35-39.9)     Ideal Body Weight (IBW), Female: 100 lb     % Ideal Body Weight, Female (lb): 183.87 %                             Usual Weight Provided By: not applicable    Wt Readings from Last 5 Encounters:   04/10/23 83.4 kg (183 lb 13.8 oz)     Weight Change(s) Since Admission:  Admit Weight: 99.8 kg (220 lb) (04/10/23 0810)      Patient Education    Not applicable.    Monitoring & Evaluation     Dietitian will monitor food and beverage intake, weight, weight change, electrolyte/renal panel, glucose/endocrine profile, and gastrointestinal profile.  Nutrition Risk/Follow-Up: low (follow-up in 5-7 days)  Patients assigned 'low nutrition risk' status do not qualify for a full nutritional assessment but will be monitored and re-evaluated in a 5-7 day time period. Please consult if re-evaluation needed sooner.

## 2023-04-11 NOTE — PT/OT/SLP EVAL
Physical Therapy obs Evaluation and Treatment    Patient Name: Yris Dunlap   MRN: [id number]  Recent Surgery: * No surgery found *      Recommendations:     Discharge Recommendations: home, home with home health, home health OT, home health PT   Discharge Equipment Recommendations: none   Barriers to discharge: Increased level of assist, Time since onset    Assessment:     Yris Dunlap is a 68 y.o. female admitted with a medical diagnosis of TIA (transient ischemic attack). She presents with the following impairments/functional limitations: weakness, gait instability, impaired balance, decreased safety awareness.     Rehab Prognosis: Good; patient would benefit from acute PT services to address these deficits and reach maximum level of function.    Plan:     During this hospitalization, patient to be seen 6 x/week to address the above listed problems via gait training, therapeutic activities, therapeutic exercises    Plan of Care Expires: 04/25/23    Subjective     Chief Complaint: wants to go home. Hospital is making her stay in the bed.   Patient Comments/Goals: go home  Pain/Comfort:  Pain Rating 1: 0/10  Pain Rating Post-Intervention 1: 0/10    Social History:  Living Environment: Patient lives with their child(jose) in a single story home with number of outside stair(s): 4 with B rails  Prior Level of Function: Prior to admission, patient was modified independent  Equipment Used at Home: walker, rolling, cane, straight  DME owned (not currently used): none  Assistance Upon Discharge: Family support and HH    Objective:     Communicated with nursing and OT prior to session. Patient found HOB elevated with telemetry, pulse ox (continuous) upon PT entry to room.    General Precautions: Standard, NPO   Orthopedic Precautions:     Braces:      Respiratory Status: Room air    Exams:  Cognitive Exam: Patient is oriented to Person, Place, Time, Situation, follows commands 75% of the time  RLE ROM: WNL  RLE  Strength: WFL, grossly 4/5  LLE ROM: WNL  LLE Strength: WFL, grossly 3/5 and pt would not allow pressure due to pain in knee  Sensation: Intact light touch to BLEs  -     Intact  Coordination: Heel to Shin: abnormal B and Alternating Dorsiflexion/Plantarflexion: abnormal B  Tone:  normal    Functional Mobility:  Gait belt applied - Yes  Bed Mobility  Rolling Left: stand by assistance  Rolling Right: stand by assistance  Supine to Sit: contact guard assistance for trunk management  Sit to Supine: contact guard assistance for LE management  Transfers  Sit to Stand: minimum assistance with rolling walker  Bed to Chair: minimum assistance with rolling walker using Stand Pivot  Gait  Patient ambulated 400 feet with rolling walker and minimum assistance and of 2 persons. Patient demonstrates unsteady gait and ambulates outside VIOLETA of RW. Most losses of balance during R turns. .  Balance:  Static Standing: contact guard assistance with rolling walker  Fair: Patient able to maintain balance with handhold support; may require occasional minimal assistance.  Dynamic Standing: minimum assistance with rolling walker  Poor: Patient unable to accept challenge or move without loss of balance.      Therapeutic Activities and Exercises:  Patient educated on role of acute care PT and PT POC, safety while in hospital including calling nurse for mobility, and call light usage.  Educated about importance of OOB mobility and remaining up in chair most of the day.  Family in room and nursing educated on transfer abilities.     AM-PAC 6 CLICK MOBILITY  Turning over in bed (including adjusting bedclothes, sheets and blankets)?: 4  Sitting down on and standing up from a chair with arms (e.g., wheelchair, bedside commode, etc.): 3  Moving from lying on back to sitting on the side of the bed?: 3  Moving to and from a bed to a chair (including a wheelchair)?: 3  Need to walk in hospital room?: 3  Climbing 3-5 steps with a railing?: 1  Basic  Mobility Total Score: 17     Patient left up in chair with all lines intact, call button in reach, RN notified, chair alarm on, and RN and family present.    GOALS:   Multidisciplinary Problems       Physical Therapy Goals          Problem: Physical Therapy    Goal Priority Disciplines Outcome Goal Variances Interventions   Physical Therapy Goal     PT, PT/OT Ongoing, Progressing     Description: Goals to be met by: Discharge     Patient will increase functional independence with mobility by performin. Sit to stand transfer with Stand-by Assistance  2. Bed to chair transfer with Stand-by Assistance using Rolling Walker  3. Gait  x 100 feet with Stand-by Assistance using Rolling Walker.                          History:     Past Medical History:   Diagnosis Date    Diabetes mellitus     Gout, unspecified     Hypertension     Unspecified injury of head, initial encounter        No past surgical history on file.    Time Tracking:     PT Received On: 23  PT Start Time: 855  PT Stop Time: 935  PT Total Time (min): 40 min     Billable Minutes: Evaluation Moderate Complexity     2023

## 2023-04-11 NOTE — PT/OT/SLP EVAL
Occupational Therapy   Acute Care Evaluation    Name: Yris Dunlap  MRN: [id number]  Admitting Diagnosis:  TIA (transient ischemic attack)      History:   Yris Dunlap is a 68 y.o. female with a medical diagnosis of TIA (transient ischemic attack).    Past Medical History:   Diagnosis Date    Diabetes mellitus     Gout, unspecified     Hypertension     Unspecified injury of head, initial encounter        No past surgical history on file.    Subjective     Chief Complaint: dysarthria  Patient/Family Comments/goals: return to [facility]    Occupational Profile:  Lives with: adult children  Home Environment: [facility] 4 steps to enter c railing  Previous level of function: Mod I for bADLs, assistance for meals and transportation  Equipment Used at Home:  walker, rolling, cane, straight, shower chair, HH shower head      Pain/Comfort:  Pain Rating 1: 0/10    Blood Pressure:     Objective:     Communicated with: NSG and physical therapy prior to session.  Patient found HOB elevated with telemetry, pulse ox (continuous), restraints upon OT entry to room.    General Precautions: Standard, NPO   Orthopedic Precautions:    Braces:    Respiratory Status: Room air    Occupational Performance:    Mobility  Assist level Comments    Bed mobility SBA    Transfer Min A    Functional mobility Min A x2 With use of RW   Sit to stand transitions Min A    Other:          Activities of Daily Living Assist level Comments    Feeding independent    Grooming/hygiene contact guard    Bathing NT    Upper body dressing set up    Lower body dressing set up    Toileting contact guard    Toilet transfer minimal assist    Adaptive equipment training     Other:       Physical Exam:  Upper Extremity Range of Motion:     -       Right Upper Extremity: WFL  -       Left Upper Extremity: WFL  Upper Extremity Strength:    -       Right Upper Extremity: grossly 4-/5  -       Left Upper Extremity: grossly 4/5  Fine Motor Coordination:    -       Impaired   Left hand thumb/finger opposition skills   and Right hand thumb/finger opposition skills    Decreased RUE  strength compared to LUE  Pt right hand dominant    Cognitive/Visual Perceptual:  Cognitive/Psychosocial Skills:     -       Oriented to: Person, Place, Time, and Situation       Treatment & Education:  Pt and family member educate on role of OT, OT POC, and OT goals. Pt educated on need to call for assistance for mobility. Pt and family member verbalized understanding of all OT recommendations.    Patient left up in chair with all lines intact, call button in reach, chair alarm on, and NSG and family present    Assessment:     Yris Dunlap is a 68 y.o. female with a medical diagnosis of TIA (transient ischemic attack). Performance deficits affecting function: weakness, impaired self care skills, impaired functional mobility, impaired balance, decreased safety awareness, impaired fine motor.      Rehab Prognosis: Fair; patient would benefit from acute skilled OT services to address these deficits and reach maximum level of function.       Plan:     Patient to be seen  (QD-BID as appropriate) to address the above listed problems via self-care/home management, therapeutic activities, therapeutic exercises  Plan of Care Reviewed with: patient, grandchild(jose)      GOALS:   Multidisciplinary Problems       Occupational Therapy Goals          Problem: Occupational Therapy    Goal Priority Disciplines Outcome Interventions   Occupational Therapy Goal     OT, PT/OT Ongoing, Progressing    Description: Goals to be met by: 4/14/2023     Patient will increase functional independence with ADLs by performing:    Grooming while standing with Stand-by Assistance.  Toileting from toilet with Supervision for hygiene and clothing management.   Toilet transfer to toilet with Stand-by Assistance.                           Recommendations:     Discharge Recommendations: home with home health  Discharge Equipment  Recommendations:  none      Time Tracking:     OT Date of Treatment: 04/11/23  OT Start Time: 0855  OT Stop Time: 0935  OT Total Time (min): 40 min    Billable Minutes:Evaluation 40    4/11/2023

## 2023-04-11 NOTE — PLAN OF CARE
[facility]  Initial Discharge Assessment       Primary Care Provider: No primary care provider on file.    Admission Diagnosis: Aphasia [R47.01]  TIA (transient ischemic attack) [G45.9]    Admission Date: 4/10/2023  Expected Discharge Date:     Discharge Barriers Identified: None    Payor: [employer] MANAGED MEDICARE / Plan: [employer]ET[employer] MEDICARE DUAL DSNP / Product Type: Medicare Advantage /     Extended Emergency Contact Information  Primary Emergency Contact: flavio graham  Mobile Phone: [phone]  Relation: Daughter  Preferred language: English   needed? No    Discharge Plan A: Home with family, Home Health         [address] [address]  Phone: [phone] Fax: [phone]      Initial Assessment (most recent)       Adult Discharge Assessment - 04/11/23 0758          Discharge Assessment    Assessment Type Discharge Planning Assessment     Confirmed/corrected address, phone number and insurance Yes     Confirmed Demographics Correct on Facesheet     Source of Information family     If unable to respond/provide information was family/caregiver contacted? Yes     Contact Name/Number Beckie Greene friend  [phone]     Does patient/caregiver understand observation status Yes     Communicated ELVIRA with patient/caregiver Date not available/Unable to determine     Reason For Admission TIA     People in Home friend(s)     Facility Arrived From: home     Do you expect to return to your current living situation? Yes     Do you have help at home or someone to help you manage your care at home? Yes     Who are your caregiver(s) and their phone number(s)? Beckie Greene friend  [phone]     Prior to hospitilization cognitive status: Not Oriented to Person     Current cognitive status: Not Oriented to Person     Walking or Climbing Stairs ambulation difficulty, requires equipment     Mobility Management walker     Home  Accessibility wheelchair accessible     Home Layout Able to live on 1st floor     Equipment Currently Used at Home bedside commode     Readmission within 30 days? No     Patient currently being followed by outpatient case management? No     Do you currently have service(s) that help you manage your care at home? No     Do you take prescription medications? Yes     Do you have prescription coverage? Yes     Coverage [employer]     Do you have any problems affording any of your prescribed medications? TBD     Is the patient taking medications as prescribed? yes     Who is going to help you get home at discharge? Beckie Greene friend  [phone]     How do you get to doctors appointments? family or friend will provide     Are you on dialysis? No     Do you take coumadin? No     Discharge Plan A Home with family;Home Health     DME Needed Upon Discharge  walker, standard;wheelchair     Discharge Plan discussed with: Friend     Name(s) and Number(s) Beckie Greene friend  [phone]     Discharge Barriers Identified None        Physical Activity    On average, how many days per week do you engage in moderate to strenuous exercise (like a brisk walk)? 0 days     On average, how many minutes do you engage in exercise at this level? 0 min        Financial Resource Strain    How hard is it for you to pay for the very basics like food, housing, medical care, and heating? Not hard at all        Housing Stability    In the last 12 months, was there a time when you were not able to pay the mortgage or rent on time? No     In the last 12 months, how many places have you lived? 1     In the last 12 months, was there a time when you did not have a steady place to sleep or slept in a shelter (including now)? No        Transportation Needs    In the past 12 months, has lack of transportation kept you from medical appointments or from getting medications? No     In the past 12 months, has lack of transportation kept you from meetings,  work, or from getting things needed for daily living? No        Food Insecurity    Within the past 12 months, you worried that your food would run out before you got the money to buy more. Never true     Within the past 12 months, the food you bought just didn't last and you didn't have money to get more. Never true        Stress    Do you feel stress - tense, restless, nervous, or anxious, or unable to sleep at night because your mind is troubled all the time - these days? Only a little        Social Connections    In a typical week, how many times do you talk on the phone with family, friends, or neighbors? More than three times a week     How often do you get together with friends or relatives? More than three times a week     How often do you attend Amish or Zoroastrian services? More than 4 times per year     Do you belong to any clubs or organizations such as Amish groups, unions, fraternal or athletic groups, or school groups? No     How often do you attend meetings of the clubs or organizations you belong to? 1 to 4 times per year     Are you , , , , never , or living with a partner? Never         Alcohol Use    Q1: How often do you have a drink containing alcohol? Never     Q2: How many drinks containing alcohol do you have on a typical day when you are drinking? Patient does not drink     Q3: How often do you have six or more drinks on one occasion? Never        OTHER    Name(s) of People in Home Beckie Ladit friend  [phone]

## 2023-04-11 NOTE — PLAN OF CARE
Problem: Physical Therapy  Goal: Physical Therapy Goal  Description: Goals to be met by: Discharge     Patient will increase functional independence with mobility by performin. Sit to stand transfer with Stand-by Assistance  2. Bed to chair transfer with Stand-by Assistance using Rolling Walker  3. Gait  x 100 feet with Stand-by Assistance using Rolling Walker.     Outcome: Ongoing, Progressing

## 2023-04-11 NOTE — PLAN OF CARE
Problem: Occupational Therapy  Goal: Occupational Therapy Goal  Description: Goals to be met by: 4/14/2023     Patient will increase functional independence with ADLs by performing:    Grooming while standing with Stand-by Assistance.  Toileting from toilet with Supervision for hygiene and clothing management.   Toilet transfer to toilet with Stand-by Assistance.    Outcome: Ongoing, Progressing

## 2023-04-11 NOTE — PLAN OF CARE
Problem: Adult Inpatient Plan of Care  Goal: Plan of Care Review  Outcome: Ongoing, Progressing  Flowsheets (Taken 4/11/2023 1544)  Plan of Care Reviewed With:   patient   daughter  Goal: Patient-Specific Goal (Individualized)  Outcome: Ongoing, Progressing  Flowsheets (Taken 4/11/2023 1544)  Anxieties, Fears or Concerns: restless, anxious about ambulation  Individualized Care Needs: roll belt, family at bedside to monitor  Goal: Absence of Hospital-Acquired Illness or Injury  Outcome: Ongoing, Progressing  Intervention: Identify and Manage Fall Risk  Flowsheets (Taken 4/11/2023 1544)  Safety Promotion/Fall Prevention:   assistive device/personal item within reach   pulse ox   Roll belt self-releasing   room near unit station   instructed to call staff for mobility   chair alarm set   bedside commode chair   gait belt with ambulation   in recliner, wheels locked   nonskid shoes/socks when out of bed   family to remain at bedside  Intervention: Prevent Skin Injury  Flowsheets (Taken 4/11/2023 1544)  Body Position:   position changed independently   legs elevated   sitting up in bed   weight shifting  Skin Protection:   adhesive use limited   tubing/devices free from skin contact   pulse oximeter probe site changed  Intervention: Prevent and Manage VTE (Venous Thromboembolism) Risk  Flowsheets (Taken 4/11/2023 1544)  Activity Management:   Ambulated in pinon - L4   Up to bedside commode - L3   Up in chair - L3   Standing - L3  VTE Prevention/Management:   ambulation promoted   bleeding precations maintained   bleeding risk assessed   dorsiflexion/plantar flexion performed  Range of Motion: active ROM (range of motion) encouraged  Intervention: Prevent Infection  Flowsheets (Taken 4/11/2023 1544)  Infection Prevention:   cohorting utilized   hand hygiene promoted   single patient room provided  Goal: Optimal Comfort and Wellbeing  Outcome: Ongoing, Progressing  Goal: Readiness for Transition of Care  Outcome: Ongoing,  Progressing     Problem: Bariatric Environmental Safety  Goal: Safety Maintained with Care  Outcome: Ongoing, Progressing     Problem: Infection  Goal: Absence of Infection Signs and Symptoms  Outcome: Ongoing, Progressing     Problem: Fall Injury Risk  Goal: Absence of Fall and Fall-Related Injury  Outcome: Ongoing, Progressing  Intervention: Identify and Manage Contributors  Flowsheets (Taken 4/11/2023 2393)  Self-Care Promotion:   independence encouraged   BADL personal objects within reach  Medication Review/Management: medications reviewed  Intervention: Promote Injury-Free Environment  Flowsheets (Taken 4/11/2023 0309)  Safety Promotion/Fall Prevention:   assistive device/personal item within reach   pulse ox   Roll belt self-releasing   room near unit station   instructed to call staff for mobility   chair alarm set   bedside commode chair   gait belt with ambulation   in recliner, wheels locked   nonskid shoes/socks when out of bed   family to remain at bedside

## 2023-04-11 NOTE — PLAN OF CARE
Problem: SLP  Goal: SLP Goal  Description: LTG:  Pt will tolerate LRD w/o overt s/s of aspiration.  Pt will improve speech intelligibility to 95% at the conversation level.    STG:  Pt will complete speech drills x40 SBA.  Pt will utilize clear speech strategies at the sentence level w/ 90% speech intelligibility.  Pt will tolerate easy to chew diet w/ thin liquids w/ good oral clearance and w/o overt s/s of aspiration.  Outcome: Ongoing, Progressing

## 2023-04-11 NOTE — PT/OT/SLP EVAL
Speech Language Pathology Evaluation  Cognitive/Bedside Swallow    Patient Name:  Yris Dunlap   MRN:  [id number]  Admitting Diagnosis: TIA (transient ischemic attack)    Recommendations:                  General Recommendations:  Dysphagia therapy and Speech/language therapy  Diet recommendations:  Easy to Chew Diet - IDDSI Level 7, Thin liquids - IDDSI Level 0   Aspiration Precautions: 1 bite/sip at a time, Avoid talking while eating, Eliminate distractions, Frequent oral care, HOB to 90 degrees, Small bites/sips, and Standard aspiration precautions   General Precautions: Standard, fall  Communication strategies:  go to room if call light pushed    History:     Past Medical History:   Diagnosis Date    Diabetes mellitus     Gout, unspecified     Hypertension     Unspecified injury of head, initial encounter        No past surgical history on file.    Social History: Patient lives with 3 sons and daughter.    Prior Intubation HX:      Modified Barium Swallow: not warranted at this time.    Chest X-Rays:     Prior diet: NPO    Occupation/hobbies/homemaking: Pt no longer drives. Pt's children cook pt meals and assist w/ medication management. Per pt's granddaughter, the pt was independent prior to TIA.    Subjective     Pt sitting in gerichair following PT and OT eval. Pt's granddaughter present. Pt w/ roll belt in place and call light on lap.    Patient goals: to go home     Pain/Comfort:       Respiratory Status: Room air    Objective:     Cognitive Status:    Orientation Person, Place, and Situation  Memory Immediate Recall WFL, Delayedpt unable to recall 3 words following a 3 minute filled delay despite semantic cueing provided, and long term recall 67% acc  Problem [employer] 100% acc and Compare/contrast 100% acc       Receptive Language:   Comprehension:      Questions Complex yes/no 100% acc  Commands  two step basic commands 100% acc    Expressive Language:  Verbal:    Automatic Speech  Sentence  "completion 100% acc  Sentence formulation pt able to adequately formulate sentences and get messages across, though using "me" consistently instead of using the appropriate "I."        Motor Speech:  Dysarthria moderate  Intelligibility 80% to unfamiliar listener  Articulation imprecise  Initiation impaired    Voice:   WFL    Visual-Spatial:  Did not assess    Reading:   Did not assess      Written Expression:   Did not assess    Oral Musculature Evaluation  Dentition: edentulous  Secretion Management: adequate  Oral Labial Strength and Mobility: WFL    Bedside Swallow Eval:   Consistencies Assessed:  Thin liquids single and sequential cup sips  Puree pudding  Soft solids cookie  Solids karthikeyan cracker      Oral Phase:   Prolonged mastication  Lingual residue    Pharyngeal Phase:   no overt clinical signs/symptoms of pharyngeal dysphagia    Pt also seen w/ whole pills 1 and 2 AAT. Pt demonstrated good tolerance and no difficulty noted.     Compensatory Strategies  None    Treatment: skilled SLP services are warranted to address dysarthria s/p TIA.  Pt's granddaughter stating that pt's speech is far from baseline. Pt's granddaughter also stating that the pt is impulsive since being in the hospital and at home the pt is calm and patient.    Assessment:     Yris Dunlap is a 68 y.o. female with an SLP diagnosis of Dysphagia and Dysarthria.  She presents with edentulous status, therefore was placed on an easy to chew diet. Pt presents w/ dysarthric speech s/p TIA and SLP will focus on speech intelligibility. Pt demonstrates some impulsivity since hospital admit and remains a fall risk.     Goals:   Multidisciplinary Problems       SLP Goals          Problem: SLP    Goal Priority Disciplines Outcome   SLP Goal     SLP Ongoing, Progressing   Description: LTG:  Pt will tolerate LRD w/o overt s/s of aspiration.  Pt will improve speech intelligibility to 95% at the conversation level.    STG:  Pt will complete speech " drills x40 SBA.  Pt will utilize clear speech strategies at the sentence level w/ 90% speech intelligibility.  Pt will tolerate easy to chew diet w/ thin liquids w/ good oral clearance and w/o overt s/s of aspiration.                       Plan:     Patient to be seen:   (PRN)   Plan of Care expires:  04/21/23  Plan of Care reviewed with:  patient, grandchild(jose)   SLP Follow-Up:  Yes       Discharge recommendations:      Barriers to Discharge:  Level of Skilled Assistance Needed see Pt/Ot notes    Time Tracking:     SLP Treatment Date:      Speech Start Time:  0937  Speech Stop Time:  1015     Speech Total Time (min):  38 min    Billable Minutes: Eval 20 speech/language/cognition and 18 swallowing     Chrissie Bourgeois M.S., CCC-SLP   04/11/2023

## 2023-04-11 NOTE — PT/OT/SLP PROGRESS
Physical Therapy Treatment Note           Name: Yris Dunlap    : 1955 (68 y.o.)  MRN: [id number]           TREATMENT SUMMARY AND RECOMMENDATIONS:    PT Received On: 23  PT Start Time: 1530     PT Stop Time: 1600  PT Total Time (min): 30 min     Subjective Assessment:   No complaints  Lethargic   x Awake, alert, cooperative  Uncooperative    Agitated  c/o pain    Appropriate  c/o fatigue    Confused x Treated at bedside     Emotionally labile  Treated in gym/dept.    Impulsive  Other:    Flat affect       Therapy Precautions:    Cognitive deficits  Spinal precautions    Collar - hard  Sternal precautions    Collar - soft   TLSO   x Fall risk  LSO    Hip precautions - posterior  Knee immobilizer    Hip precautions - anterior  WBAT    Impaired communication  Partial weightbearing    Oxygen  TTWB    PEG tube  NWB    Visual deficits  Other:    Hearing deficits          Treatment Objectives:     Mobility Training:   Assist level Comments    Bed mobility     Transfer MIN A Stand pivot using RW   Gait MIN A  2 x 140 feet using RW and WC following   Sit to stand transitions MIN A From recliner, toilet and WC   Sitting balance     Standing balance  Poor MIN A needed to maintain balance and reduce R lateral lean during bathroom tasks.    Wheelchair mobility     Car transfer     Other:          Therapeutic Exercise:   Exercise Sets Reps Comments                               Additional Comments:  Pt had 2 losses of balance towards the R during standing tasks, needing PT assistance for recovery. Reduced  on R impacting RW negotiation. PT ok'd family to bring pt outside in WC. PT to progress as able.     Assessment: Patient tolerated session better than AM.    PT Plan: continue per POC  Revisions made to plan of care: No    GOALS:   Multidisciplinary Problems       Physical Therapy Goals          Problem: Physical Therapy    Goal Priority Disciplines Outcome Goal Variances Interventions   Physical  Therapy Goal     PT, PT/OT Ongoing, Progressing     Description: Goals to be met by: Discharge     Patient will increase functional independence with mobility by performin. Sit to stand transfer with Stand-by Assistance  2. Bed to chair transfer with Stand-by Assistance using Rolling Walker  3. Gait  x 100 feet with Stand-by Assistance using Rolling Walker.                          Skilled PT Minutes Provided: 25   Communication with Treatment Team:     Equipment recommendations:       At end of treatment, patient remained:   Up in chair    x Up in wheelchair in room    In bed    With alarm activated    Bed rails up    Call bell in reach    x Family/friends present    Restraints secured properly    In bathroom with CNA/RN notified   x Nurse aware    In gym with therapist/tech    Other:

## 2023-04-12 LAB
ANION GAP SERPL CALC-SCNC: 8 MEQ/L
APPEARANCE UR: CLEAR
BACTERIA #/AREA URNS AUTO: ABNORMAL /HPF
BASOPHILS # BLD AUTO: 0.04 X10(3)/MCL (ref 0–0.2)
BASOPHILS NFR BLD AUTO: 0.4 %
BILIRUB UR QL STRIP.AUTO: NEGATIVE MG/DL
BUN SERPL-MCNC: 24.9 MG/DL (ref 9.8–20.1)
CALCIUM SERPL-MCNC: 9 MG/DL (ref 8.4–10.2)
CHLORIDE SERPL-SCNC: 103 MMOL/L (ref 98–107)
CO2 SERPL-SCNC: 28 MMOL/L (ref 23–31)
COLOR UR AUTO: YELLOW
CREAT SERPL-MCNC: 0.77 MG/DL (ref 0.55–1.02)
CREAT/UREA NIT SERPL: 32
EOSINOPHIL # BLD AUTO: 0.21 X10(3)/MCL (ref 0–0.9)
EOSINOPHIL NFR BLD AUTO: 2.2 %
ERYTHROCYTE [DISTWIDTH] IN BLOOD BY AUTOMATED COUNT: 13 % (ref 11.5–17)
GFR SERPLBLD CREATININE-BSD FMLA CKD-EPI: >60 MLS/MIN/1.73/M2
GLUCOSE SERPL-MCNC: 226 MG/DL (ref 82–115)
GLUCOSE UR QL STRIP.AUTO: 500 MG/DL
HCT VFR BLD AUTO: 38.1 % (ref 37–47)
HGB BLD-MCNC: 12.7 G/DL (ref 12–16)
IMM GRANULOCYTES # BLD AUTO: 0.04 X10(3)/MCL (ref 0–0.04)
IMM GRANULOCYTES NFR BLD AUTO: 0.4 %
KETONES UR QL STRIP.AUTO: NEGATIVE MG/DL
LEUKOCYTE ESTERASE UR QL STRIP.AUTO: NEGATIVE UNIT/L
LYMPHOCYTES # BLD AUTO: 3.44 X10(3)/MCL (ref 0.6–4.6)
LYMPHOCYTES NFR BLD AUTO: 36.1 %
MCH RBC QN AUTO: 29.6 PG (ref 27–31)
MCHC RBC AUTO-ENTMCNC: 33.3 G/DL (ref 33–36)
MCV RBC AUTO: 88.8 FL (ref 80–94)
MONOCYTES # BLD AUTO: 0.58 X10(3)/MCL (ref 0.1–1.3)
MONOCYTES NFR BLD AUTO: 6.1 %
NEUTROPHILS # BLD AUTO: 5.21 X10(3)/MCL (ref 2.1–9.2)
NEUTROPHILS NFR BLD AUTO: 54.8 %
NITRITE UR QL STRIP.AUTO: NEGATIVE
PH UR STRIP.AUTO: 5.5 [PH]
PLATELET # BLD AUTO: 297 X10(3)/MCL (ref 130–400)
PMV BLD AUTO: 9.1 FL (ref 7.4–10.4)
POCT GLUCOSE: 138 MG/DL (ref 70–110)
POCT GLUCOSE: 196 MG/DL (ref 70–110)
POCT GLUCOSE: 239 MG/DL (ref 70–110)
POTASSIUM SERPL-SCNC: 3.3 MMOL/L (ref 3.5–5.1)
PROT UR QL STRIP.AUTO: 30 MG/DL
RBC # BLD AUTO: 4.29 X10(6)/MCL (ref 4.2–5.4)
RBC #/AREA URNS AUTO: ABNORMAL /HPF
RBC UR QL AUTO: ABNORMAL UNIT/L
SODIUM SERPL-SCNC: 139 MMOL/L (ref 136–145)
SP GR UR STRIP.AUTO: 1.02
SQUAMOUS #/AREA URNS AUTO: ABNORMAL /HPF
UROBILINOGEN UR STRIP-ACNC: 1 MG/DL
WBC # SPEC AUTO: 9.5 X10(3)/MCL (ref 4.5–11.5)
WBC #/AREA URNS AUTO: ABNORMAL /HPF

## 2023-04-12 PROCEDURE — 85025 COMPLETE CBC W/AUTO DIFF WBC: CPT | Performed by: INTERNAL MEDICINE

## 2023-04-12 PROCEDURE — 94760 N-INVAS EAR/PLS OXIMETRY 1: CPT

## 2023-04-12 PROCEDURE — 80048 BASIC METABOLIC PNL TOTAL CA: CPT | Performed by: INTERNAL MEDICINE

## 2023-04-12 PROCEDURE — 63600175 PHARM REV CODE 636 W HCPCS: Performed by: STUDENT IN AN ORGANIZED HEALTH CARE EDUCATION/TRAINING PROGRAM

## 2023-04-12 PROCEDURE — 51701 INSERT BLADDER CATHETER: CPT

## 2023-04-12 PROCEDURE — 96372 THER/PROPH/DIAG INJ SC/IM: CPT | Performed by: STUDENT IN AN ORGANIZED HEALTH CARE EDUCATION/TRAINING PROGRAM

## 2023-04-12 PROCEDURE — 97116 GAIT TRAINING THERAPY: CPT | Mod: CQ

## 2023-04-12 PROCEDURE — G0378 HOSPITAL OBSERVATION PER HR: HCPCS

## 2023-04-12 PROCEDURE — 97530 THERAPEUTIC ACTIVITIES: CPT

## 2023-04-12 PROCEDURE — 25000003 PHARM REV CODE 250: Performed by: STUDENT IN AN ORGANIZED HEALTH CARE EDUCATION/TRAINING PROGRAM

## 2023-04-12 PROCEDURE — 25000003 PHARM REV CODE 250: Performed by: INTERNAL MEDICINE

## 2023-04-12 PROCEDURE — 81001 URINALYSIS AUTO W/SCOPE: CPT | Performed by: INTERNAL MEDICINE

## 2023-04-12 PROCEDURE — 51798 US URINE CAPACITY MEASURE: CPT

## 2023-04-12 PROCEDURE — 92507 TX SP LANG VOICE COMM INDIV: CPT | Mod: 59

## 2023-04-12 PROCEDURE — 97110 THERAPEUTIC EXERCISES: CPT | Mod: 59

## 2023-04-12 PROCEDURE — 97110 THERAPEUTIC EXERCISES: CPT | Mod: CQ

## 2023-04-12 PROCEDURE — 97112 NEUROMUSCULAR REEDUCATION: CPT

## 2023-04-12 RX ORDER — POTASSIUM CHLORIDE 20 MEQ/1
40 TABLET, EXTENDED RELEASE ORAL
Status: COMPLETED | OUTPATIENT
Start: 2023-04-12 | End: 2023-04-12

## 2023-04-12 RX ADMIN — Medication 6 MG: at 08:04

## 2023-04-12 RX ADMIN — SERTRALINE HYDROCHLORIDE 25 MG: 25 TABLET ORAL at 08:04

## 2023-04-12 RX ADMIN — SITAGLIPTIN 100 MG: 50 TABLET, FILM COATED ORAL at 08:04

## 2023-04-12 RX ADMIN — LISINOPRIL 5 MG: 2.5 TABLET ORAL at 04:04

## 2023-04-12 RX ADMIN — INSULIN ASPART 1 UNITS: 100 INJECTION, SOLUTION INTRAVENOUS; SUBCUTANEOUS at 08:04

## 2023-04-12 RX ADMIN — CETIRIZINE HYDROCHLORIDE 10 MG: 10 TABLET, FILM COATED ORAL at 04:04

## 2023-04-12 RX ADMIN — POTASSIUM CHLORIDE 40 MEQ: 1500 TABLET, EXTENDED RELEASE ORAL at 08:04

## 2023-04-12 RX ADMIN — ASPIRIN 81 MG: 81 TABLET, COATED ORAL at 08:04

## 2023-04-12 RX ADMIN — ATORVASTATIN CALCIUM 40 MG: 40 TABLET, FILM COATED ORAL at 08:04

## 2023-04-12 RX ADMIN — GLIPIZIDE 10 MG: 10 TABLET ORAL at 08:04

## 2023-04-12 RX ADMIN — METOPROLOL SUCCINATE 100 MG: 50 TABLET, FILM COATED, EXTENDED RELEASE ORAL at 08:04

## 2023-04-12 RX ADMIN — CLOPIDOGREL BISULFATE 75 MG: 75 TABLET ORAL at 08:04

## 2023-04-12 RX ADMIN — POTASSIUM CHLORIDE 40 MEQ: 1500 TABLET, EXTENDED RELEASE ORAL at 04:04

## 2023-04-12 RX ADMIN — INSULIN ASPART 2 UNITS: 100 INJECTION, SOLUTION INTRAVENOUS; SUBCUTANEOUS at 11:04

## 2023-04-12 RX ADMIN — HYDROCHLOROTHIAZIDE 12.5 MG: 12.5 TABLET ORAL at 06:04

## 2023-04-12 RX ADMIN — QUETIAPINE FUMARATE 50 MG: 50 TABLET ORAL at 08:04

## 2023-04-12 RX ADMIN — ENOXAPARIN SODIUM 40 MG: 40 INJECTION SUBCUTANEOUS at 04:04

## 2023-04-12 NOTE — PLAN OF CARE
Problem: Fall Injury Risk  Goal: Absence of Fall and Fall-Related Injury  4/11/2023 1955 by Daphney Hargrove RN  Outcome: Ongoing, Progressing  4/11/2023 1954 by Daphney Hargrove RN  Outcome: Ongoing, Progressing  Intervention: Identify and Manage Contributors  4/11/2023 1955 by Daphney Hargrove RN  Flowsheets (Taken 4/11/2023 1955)  Self-Care Promotion:   independence encouraged   BADL personal objects within reach   BADL personal routines maintained  Medication Review/Management: medications reviewed  4/11/2023 1954 by Daphney Hargrove RN  Flowsheets (Taken 4/11/2023 1954)  Self-Care Promotion:   independence encouraged   BADL personal objects within reach   BADL personal routines maintained  Medication Review/Management: medications reviewed  Intervention: Promote Injury-Free Environment  4/11/2023 1955 by Daphney Hargrove RN  Flowsheets (Taken 4/11/2023 1955)  Safety Promotion/Fall Prevention:   assistive device/personal item within reach   chair alarm set   gait belt with ambulation  4/11/2023 1954 by Daphney Hargrove RN  Flowsheets (Taken 4/11/2023 1954)  Safety Promotion/Fall Prevention:   assistive device/personal item within reach   chair alarm set   Roll belt self-releasing     Problem: Hyperglycemia  Goal: Blood Glucose Level Within Targeted Range  4/11/2023 1955 by Daphney Hargrove RN  Outcome: Ongoing, Progressing  4/11/2023 1954 by Daphney Hargrove RN  Outcome: Ongoing, Progressing  Intervention: Optimize Glycemic Control  Flowsheets (Taken 4/11/2023 1955)  Glycemic Management: blood glucose monitored

## 2023-04-12 NOTE — PROGRESS NOTES
[facility] HOSPITAL MEDICINE  PROGRESS NOTE        CHIEF COMPLAINT   Hospital follow up    HOSPITAL COURSE   68-year-old female with past medical history of traumatic brain injury, hypertension, diabetes mellitus, hyperlipidemia, gout who presents with complaint of dysarthria this morning.  Family at bedside reports last known normal was last night before she went to bed.  When she woke up patient experienced dizziness, vomiting and marked dysarthria.  At baseline family reports she speaks normally however today she has a staggered speech pattern.  Reportedly per ED physician Neurology was consulted and reported patient could remain at our facility if no large vessel occlusion which was ruled out by CTA.  MRI did not show evidence of acute ischemia.  Dysarthria remains.   At baseline patient is independent for ADLs  Repeat Mri brain was performed 4/11/2024 which did indeed show pontine left paramedian large acute nonhemorrhagic infarct.  She is aspirin naive at home.      Today  Seen and examined this morning.  She is doing better working with therapy today.  Had difficult time getting to sleep last night was better after getting seroquel.  Updated her and daughter regarding results of MRI.         OBJECTIVE/PHYSICAL EXAM     VITAL SIGNS (MOST RECENT):  Temp: 98 °F (36.7 °C) (04/12/23 0754)  Pulse: 73 (04/12/23 0754)  Resp: 18 (04/12/23 0750)  BP: 137/68 (04/12/23 0754)  SpO2: 96 % (04/12/23 0754)   VITAL SIGNS (24 HOUR RANGE):  Temp:  [97.3 °F (36.3 °C)-98.4 °F (36.9 °C)] 98 °F (36.7 °C)  Pulse:  [64-84] 73  Resp:  [12-20] 18  SpO2:  [92 %-97 %] 96 %  BP: (120-158)/(68-85) 137/68   GENERAL: In no acute distress, afebrile  HEENT:  CHEST: Clear to auscultation bilaterally  HEART: S1, S2, no appreciable murmur  ABDOMEN: Soft, nontender, BS +  MSK: Warm, no lower extremity edema, no clubbing or cyanosis  NEUROLOGIC: Alert and oriented x4, moving all extremities with good strength, expressive  aphasia, no facial droop, tongue deviates slightly to the right   INTEGUMENTARY:  PSYCHIATRY:        ASSESSMENT/PLAN   Acute ischemic CVA-large left paramedian pontine  -Neurocheck every 4 hours for today and can reduce frequency tomorrow.    She is aspirin naive, needs asa and plavix x 21 days followed by asa alone.  Atorvastatin 40mg daily.    -PT OT ST eval and treatment today    Essential Hypertension  -continue lisinopril and metoprolol.  Up titrate lisinopril if needed.     Hyperlipidemia  -, goal will be less than 70  -Change pravastatin to atorvastatin (high intensity statin)    Diabetes Mellitus non insulin dependent type 2  -Continue glipizide and Januvia.  Add metformin.  A1c 8.5    Insomnia  -Melatonin and seroquel    Generalized anxiety disorder  -Started on sertraline       DVT prophylaxis:  Lovenox 40    Anticipated discharge and disposition:   __________________________________________________________________________    LABS/MICRO/MEDS/DIAGNOSTICS       LABS  Recent Labs     04/10/23  0815 04/11/23  0311 04/12/23  0603      < > 139   K 3.5   < > 3.3*   CHLORIDE 101   < > 103   CO2 26   < > 28   BUN 19.0   < > 24.9*   CREATININE 1.01   < > 0.77   GLUCOSE 385*   < > 226*   CALCIUM 9.4   < > 9.0   ALKPHOS 74  --   --    AST 10  --   --    ALT 10  --   --    ALBUMIN 3.7  --   --     < > = values in this interval not displayed.       Recent Labs     04/12/23  0603   WBC 9.5   RBC 4.29   HCT 38.1   MCV 88.8            MICROBIOLOGY  Microbiology Results (last 7 days)       ** No results found for the last 168 hours. **               MEDICATIONS   aspirin  81 mg Oral Daily    atorvastatin  40 mg Oral QHS    cetirizine  10 mg Oral Daily    clopidogreL  75 mg Oral Daily    enoxaparin  40 mg Subcutaneous Daily    glipiZIDE  10 mg Oral BID    hydroCHLOROthiazide  12.5 mg Oral QAM    lisinopriL  5 mg Oral Daily    melatonin  6 mg Oral Nightly    metoprolol succinate  100 mg Oral BID     potassium chloride  40 mEq Oral Q8H    QUEtiapine  50 mg Oral QHS    sertraline  25 mg Oral Daily    SITagliptin phosphate  100 mg Oral Daily         INFUSIONS         DIAGNOSTIC TESTS  MRI Brain Without Contrast   Final Result      1.  Pontine left paramedian large acute nonhemorrhagic infarct.      2.  Chronic microangiopathic ischemia and atrophy.         Electronically signed by: Fazal Melgar   Date:    04/11/2023   Time:    15:42      US Carotid Bilateral   Final Result      Mild atherosclerotic disease.  Less than 50% bilateral internal carotid stenosis by velocity criteria.         Electronically signed by: Anival Gold   Date:    04/10/2023   Time:    16:04      MRI Brain Without Contrast   Final Result      No definite acute intracranial abnormality.  There is a geographic region of T2 shine through left kylah consistent with prior ischemia.  This finding is new compared to the prior exam from 2016.      Moderate chronic microvascular white matter ischemic change         Electronically signed by: Xander Lang MD   Date:    04/10/2023   Time:    14:00      CTA Head and Neck (xpd)   Final Result      No acute abnormality. No high-grade stenosis or major vessel occlusion.         Electronically signed by: Juan Meza MD   Date:    04/10/2023   Time:    11:45      CT Head Without Contrast   Final Result      No acute intracranial findings.         Electronically signed by: Juan Meza MD   Date:    04/10/2023   Time:    08:20           EF   Date Value Ref Range Status   04/10/2023 70 % Final              Case related differential diagnoses have been reviewed; assessment and plan has been documented. I have personally reviewed the labs and test results that are currently available; I have reviewed the patients medication list. I have reviewed the consulting providers recommendations. I have reviewed or attempted to review medical records based upon their availability.  All of the patient's and/or family's  questions have been addressed and answered to the best of my ability.  I will continue to monitor closely and make adjustments to medical management as needed.  This document was created using M*Modal Fluency Direct.  Transcription errors may have been made.  Please contact me if any questions may rise regarding documentation to clarify transcription.        Jimbo Swanson MD   04/12/2023   Internal Medicine

## 2023-04-12 NOTE — PT/OT/SLP PROGRESS
Physical Therapy Treatment Note           Name: Yris Dunlap    : 1955 (68 y.o.)  MRN: [id number]           TREATMENT SUMMARY AND RECOMMENDATIONS:    PT Received On: 23  PT Start Time: 1400     PT Stop Time: 1438  PT Total Time (min): 38 min     Subjective Assessment:   No complaints  Lethargic   x Awake, alert, cooperative  Uncooperative    Agitated  c/o pain    Appropriate  c/o fatigue    Confused  Treated at bedside     Emotionally labile x Treated in gym/dept.    Impulsive  Other:    Flat affect       Therapy Precautions:    Cognitive deficits  Spinal precautions    Collar - hard  Sternal precautions    Collar - soft   TLSO   x Fall risk  LSO    Hip precautions - posterior  Knee immobilizer    Hip precautions - anterior  WBAT    Impaired communication  Partial weightbearing    Oxygen  TTWB    PEG tube  NWB    Visual deficits  Other:    Hearing deficits          Treatment Objectives:     Mobility Training:   Assist level Comments    Bed mobility MIN A Rolling R and supine>sit   Transfer MIN A Stand pivot using RW   Gait MIN A  2 x 140 feet using RW and WC following   Sit to stand transitions CGA From WC x 10 reps   Sitting balance Fair+ B UE reaches and isometrics completed for core stability work. Wb'ing thorugh R UE   Standing balance  Fair- CGA for dynamic standing tasks including wt shifting and reaching. No losses in balance demonstrated.    Wheelchair mobility     Car transfer     Other:          Therapeutic Exercise:   Exercise Sets Reps Comments   Seated B LE modified leg press  20 Against PT resistance   Seated B LE LAQ with hold at end range  20                    Additional Comments:  Pt with cues still needed for R UE  on RW, impacting assistance levels needed. Pt progressing with overall balance and tolerance. PT to progress as able.   Assessment: Patient tolerated session well    PT Plan: continue per POC  Revisions made to plan of care: No    GOALS:    Multidisciplinary Problems       Physical Therapy Goals          Problem: Physical Therapy    Goal Priority Disciplines Outcome Goal Variances Interventions   Physical Therapy Goal     PT, PT/OT Ongoing, Progressing     Description: Goals to be met by: Discharge     Patient will increase functional independence with mobility by performin. Sit to stand transfer with Stand-by Assistance  2. Bed to chair transfer with Stand-by Assistance using Rolling Walker  3. Gait  x 100 feet with Stand-by Assistance using Rolling Walker.                          Skilled PT Minutes Provided: 38   Communication with Treatment Team:     Equipment recommendations:       At end of treatment, patient remained:  x Up in chair     Up in wheelchair in room    In bed    With alarm activated    Bed rails up    Call bell in reach     Family/friends present    Restraints secured properly    In bathroom with CNA/RN notified    Nurse aware   x In gym with therapist/tech    Other:

## 2023-04-12 NOTE — PLAN OF CARE
Problem: Adult Inpatient Plan of Care  Goal: Plan of Care Review  Outcome: Ongoing, Progressing  Flowsheets (Taken 4/12/2023 1209)  Plan of Care Reviewed With:   patient   daughter  Goal: Absence of Hospital-Acquired Illness or Injury  Outcome: Ongoing, Progressing  Intervention: Identify and Manage Fall Risk  Flowsheets (Taken 4/12/2023 1209)  Safety Promotion/Fall Prevention:   assistive device/personal item within reach   instructed to call staff for mobility   bed alarm refused   gait belt with ambulation   lighting adjusted   medications reviewed   nonskid shoes/socks when out of bed   family to remain at bedside  Intervention: Prevent Skin Injury  Flowsheets (Taken 4/12/2023 1209)  Body Position: position changed independently  Skin Protection: protective footwear used  Intervention: Prevent and Manage VTE (Venous Thromboembolism) Risk  Flowsheets (Taken 4/12/2023 1209)  Activity Management: Up in chair - L3  VTE Prevention/Management:   bleeding precations maintained   ambulation promoted   bleeding risk assessed   fluids promoted  Range of Motion: active ROM (range of motion) encouraged  Intervention: Prevent Infection  Flowsheets (Taken 4/12/2023 1209)  Infection Prevention:   cohorting utilized   personal protective equipment utilized   environmental surveillance performed   equipment surfaces disinfected   rest/sleep promoted   single patient room provided   hand hygiene promoted     Problem: Fall Injury Risk  Goal: Absence of Fall and Fall-Related Injury  Outcome: Ongoing, Progressing  Intervention: Identify and Manage Contributors  Flowsheets (Taken 4/12/2023 1209)  Self-Care Promotion:   independence encouraged   BADL personal objects within reach   BADL personal routines maintained   meal set-up provided   safe use of adaptive equipment encouraged  Medication Review/Management:   medications reviewed   high-risk medications identified  Intervention: Promote Injury-Free Environment  Flowsheets (Taken  4/12/2023 1209)  Safety Promotion/Fall Prevention:   assistive device/personal item within reach   instructed to call staff for mobility   bed alarm refused   gait belt with ambulation   lighting adjusted   medications reviewed   nonskid shoes/socks when out of bed   family to remain at bedside     Problem: Hyperglycemia  Goal: Blood Glucose Level Within Targeted Range  Outcome: Ongoing, Progressing  Intervention: Optimize Glycemic Control  Flowsheets (Taken 4/12/2023 1209)  Glycemic Management:   blood glucose monitored   supplemental insulin given

## 2023-04-12 NOTE — PT/OT/SLP PROGRESS
Physical Therapy Treatment Note           Name: Yris Dunlap    : 1955 (68 y.o.)  MRN: [id number]           TREATMENT SUMMARY AND RECOMMENDATIONS:    PT Received On: 23  PT Start Time: 1030     PT Stop Time: 1055  PT Total Time (min): 25 min     Subjective Assessment:  x No complaints  Lethargic    Awake, alert, cooperative  Uncooperative    Agitated  c/o pain    Appropriate  c/o fatigue    Confused  Treated at bedside     Emotionally labile x Treated in gym/dept.    Impulsive  Other:    Flat affect       Therapy Precautions:    Cognitive deficits  Spinal precautions    Collar - hard  Sternal precautions    Collar - soft   TLSO   x Fall risk  LSO    Hip precautions - posterior  Knee immobilizer    Hip precautions - anterior  WBAT    Impaired communication  Partial weightbearing    Oxygen  TTWB    PEG tube  NWB    Visual deficits  Other:    Hearing deficits          Treatment Objectives:     Mobility Training:   Assist level Comments    Bed mobility     Transfer     Gait Laura Amb on firm surface with RW 30 ft    Sit to stand transitions Laura Sit-stand 5 reps x 2 with B UE use   Sitting balance     Standing balance      Wheelchair mobility     Car transfer     Other:          Therapeutic Exercise:   Exercise Sets Reps Comments   UBE 10 min  UBE with B LE use   B LE exer sitting  10 reps  Ankle pumps, TKE, abd/add, knee lifts                    Additional Comments:  Unsteady during gait    Assessment: Patient tolerated session well.    PT Plan: continue  Revisions made to plan of care: No    GOALS:   Multidisciplinary Problems       Physical Therapy Goals          Problem: Physical Therapy    Goal Priority Disciplines Outcome Goal Variances Interventions   Physical Therapy Goal     PT, PT/OT Ongoing, Progressing     Description: Goals to be met by: Discharge     Patient will increase functional independence with mobility by performin. Sit to stand transfer with Stand-by Assistance  2.  Bed to chair transfer with Stand-by Assistance using Rolling Walker  3. Gait  x 100 feet with Stand-by Assistance using Rolling Walker.                          Skilled PT Minutes Provided: 25   Communication with Treatment Team:     Equipment recommendations:       At end of treatment, patient remained:   Up in chair     Up in wheelchair in room    In bed    With alarm activated    Bed rails up    Call bell in reach     Family/friends present    Restraints secured properly    In bathroom with CNA/RN notified    Nurse aware   x In gym with therapist/tech    Other:

## 2023-04-12 NOTE — PT/OT/SLP PROGRESS
Name: Yris Dunlap    : 1955 (68 y.o.)  MRN: [id number]      Patient is currently performing all functional mobility MIN/CGA. Patient is unable to ambulate within the house without assistance and will need a BSC due to staying in one room. Family support will be available.     DME Recommendations:    Bedside commode: Patient would benefit from a bedside commode to place near the bedside in order to improve independence and safety in toileting and toilet transfers. Patient is currently requiring the use of a bedside commode for safety with toilet transfers. Arm rails and adjustable height of a commode will reduce the difficulty and increase safety/independence for toilet transfers. Patient would benefit from the use of a bedside commode at home to increase safety and reduce risk of falls at home. Pt will be confined to functioning in one room due to functional deficits at this time.

## 2023-04-12 NOTE — PT/OT/SLP PROGRESS
Occupational Therapy  Treatment    Name: Yris Dunlap    : 1955 (68 y.o.)  MRN: [id number]           TREATMENT SUMMARY AND RECOMMENDATIONS:      OT Date of Treatment: 23  OT Start Time: 1110  OT Stop Time: 1133  OT Total Time (min): 23 min      Subjective Assessment:   No complaints  Lethargic   X Awake, alert, cooperative  Impulsive    Uncooperative   Flat affect    Agitated  c/o pain    Appropriate  c/o fatigue    Confused  Treated at bedside     Emotionally labile X Treated in gym/dept.      Other:        Therapy Precautions:    Cognitive deficits  Spinal precautions    Collar - hard  Sternal precautions    Collar - soft   TLSO   X Fall risk  LSO    Hip precautions - posterior  Knee immobilizer    Hip precautions - anterior  WBAT    Impaired communication  Partial weightbearing    Oxygen  TTWB    PEG tube  NWB    Visual deficits      Hearing deficits   Other:        Treatment Objectives:     Mobility Training:    Mobility task Assist level Comments    Bed mobility     Transfer Mod A Stand step t/f w/c>recliner Mod A 2/2 balance deficits; LOB x2   Sit to stands transitions     Functional mobility     Sitting balance     Standing balance      Other:              Therapeutic Ax:  Pt seated performed FMC task RUE incorporating in hand manipulation skills, intrinsic mm strengthening, and categorical naming. Min A for word finding. Multiple drops observed and increased time to complete. Min v/cs to prevent LUE compensatory use. Skilled task setup, selection, modification, and grading required.      Assessment: Patient tolerated session fair. Pt demonstrated deficits in safety awareness, balance, R  strength, R FMC/GMC, and R in hand manipulation skills. Pt would continue to benefit from skilled OT services to improve pt's safety and independence with self care tasks, decrease caregiver burden, and reduce pt's risk of falls.    OT Plan: Continue OT POC.  Revisions made to plan of care: No    GOALS:    Multidisciplinary Problems       Occupational Therapy Goals          Problem: Occupational Therapy    Goal Priority Disciplines Outcome Interventions   Occupational Therapy Goal     OT, PT/OT Ongoing, Progressing    Description: Goals to be met by: 4/14/2023     Patient will increase functional independence with ADLs by performing:    Grooming while standing with Stand-by Assistance.  Toileting from toilet with Supervision for hygiene and clothing management.   Toilet transfer to toilet with Stand-by Assistance.                         Skilled OT Minutes Provided: 23  Communication with Treatment Team:     Equipment recommendations:       At end of treatment, patient remained:  X Up in chair     Up in wheelchair in room    In bed   X With alarm activated    Bed rails up   X Call bell in reach    X Family/friends present    Restraints secured properly    In bathroom with CNA/RN notified    In gym with PT/PTA/tech    Nurse aware    Other:

## 2023-04-12 NOTE — PT/OT/SLP PROGRESS
Speech Language Pathology Treatment    Patient Name:  Yris Dunlap   MRN:  [id number]  Admitting Diagnosis: TIA (transient ischemic attack)    Recommendations:                 General Recommendations:  Speech/language therapy  Diet recommendations:  Easy to Chew Diet - IDDSI Level 7, Liquid Diet Level: Thin liquids - IDDSI Level 0   Aspiration Precautions: HOB to 90 degrees and Standard aspiration precautions   General Precautions: Standard, fall  Communication strategies:  go to room if call light pushed    Subjective     Pt sitting in gerichair upon SLP arrival w/ daughter sitting on sofa. Pt pleasant and in good spirits.    Patient goals:      Pain/Comfort:       Respiratory Status: Room air    Objective:     Has the patient been evaluated by SLP for swallowing?   Yes  Keep patient NPO? No   Current Respiratory Status:        SLP instructed pt w/ clear speech strategies and provided a visual aid to remain in room.  Pt completed rapid speech drills 3/x10 each modA.  Pt participated in structured conversational tasks w/ 80-90% speech intelligibility. Increase in intelligibility noted w/ verbal cueing to utilize clear speech strategies. Most effect strategy is slow rate.    Pt and pt's daughter report good toleration of easy to chew diet and no overt s/s of aspiration.    Overall good session.  Cont SLP POC.    Assessment:     Yris Dunlap is a 68 y.o. female with an SLP diagnosis of Dysarthria.  She presents with dysarthric speech characterized by fast rate and imprecise articulation.    Goals:   Multidisciplinary Problems       SLP Goals          Problem: SLP    Goal Priority Disciplines Outcome   SLP Goal     SLP Ongoing, Progressing   Description: LTG:  Pt will tolerate LRD w/o overt s/s of aspiration.  Pt will improve speech intelligibility to 95% at the conversation level.    STG:  Pt will complete speech drills x40 SBA.  Pt will utilize clear speech strategies at the sentence level w/ 90% speech  intelligibility.  Pt will tolerate easy to chew diet w/ thin liquids w/ good oral clearance and w/o overt s/s of aspiration.                       Plan:     Patient to be seen:   (PRN)   Plan of Care expires:  04/21/23  Plan of Care reviewed with:  patient, grandchild(jose), daughter  SLP Follow-Up:  Yes       Discharge recommendations:      Barriers to Discharge:  Level of Skilled Assistance Needed see Pt/Ot notes    Time Tracking:     SLP Treatment Date:   4/12/23  Speech Start Time:  10:10  Speech Stop Time:  10:35     Speech Total Time (min):  25 minutes    Billable Minutes: Speech Therapy Individual 25    Chrissie Bourgeois M.S., CCC-SLP   04/12/2023

## 2023-04-12 NOTE — PT/OT/SLP PROGRESS
Occupational Therapy  Treatment    Name: Yris Dunlap    : 1955 (68 y.o.)  MRN: [id number]           TREATMENT SUMMARY AND RECOMMENDATIONS:      OT Date of Treatment: 23  OT Start Time: 1439  OT Stop Time: 1510  OT Total Time (min): 31 min      Subjective Assessment:   No complaints  Lethargic   X Awake, alert, cooperative  Impulsive    Uncooperative   Flat affect    Agitated  c/o pain    Appropriate  c/o fatigue    Confused  Treated at bedside     Emotionally labile X Treated in gym/dept.      Other:        Therapy Precautions:    Cognitive deficits  Spinal precautions    Collar - hard  Sternal precautions    Collar - soft   TLSO   X Fall risk  LSO    Hip precautions - posterior  Knee immobilizer    Hip precautions - anterior  WBAT    Impaired communication  Partial weightbearing    Oxygen  TTWB    PEG tube  NWB    Visual deficits      Hearing deficits   Other:        Treatment Objectives:     Mobility Training:    Mobility task Assist level Comments    Bed mobility     Transfer Min A Stand step t/f c RW w/c>recliner  V/cs for safety awareness   Sit to stands transitions     Functional mobility     Sitting balance     Standing balance      Other:            Therapeutic Exercise:   Exercise Sets Reps Comments   2# dowel 2 10 Chest press   UBE 1 5' Forward                 NMR:  Pt seated grasped mod resistive clips c RUE and placed on target at shlder height. Proximal stability required for fxnl reaching. Pt grasped cones and placed on target at shlder height. Min A to facilitate fxnl reaching overhead.     Additional Comments:  RUE NMR HEP provided to increase RUE fxnl use and decrease pt's risk of developing learned nonuse.    Assessment: Patient tolerated session fair. Pt demonstrates deficits in RUE reach trajectory, force production, anticipatory grasp, motor control, motor planning, balance, and safety awareness. Pt is high fall risk. Pt would continue to benefit from skilled OT serives to  improve pt's safety and independence with self care tasks, decrease caregiver burden, and reduce pt's risk of falls.    OT Plan: Continue OT POC.   Revisions made to plan of care: No    GOALS:   Multidisciplinary Problems       Occupational Therapy Goals          Problem: Occupational Therapy    Goal Priority Disciplines Outcome Interventions   Occupational Therapy Goal     OT, PT/OT Ongoing, Progressing    Description: Goals to be met by: 4/14/2023     Patient will increase functional independence with ADLs by performing:    Grooming while standing with Stand-by Assistance.  Toileting from toilet with Supervision for hygiene and clothing management.   Toilet transfer to toilet with Stand-by Assistance.                         Skilled OT Minutes Provided: 31  Communication with Treatment Team:     Equipment recommendations:       At end of treatment, patient remained:  X Up in chair     Up in wheelchair in room    In bed   X With alarm activated    Bed rails up   X Call bell in reach    X Family/friends present    Restraints secured properly    In bathroom with CNA/RN notified    In gym with PT/PTA/tech    Nurse aware    Other:

## 2023-04-13 VITALS
WEIGHT: 183.88 LBS | HEART RATE: 69 BPM | BODY MASS INDEX: 36.1 KG/M2 | OXYGEN SATURATION: 95 % | TEMPERATURE: 98 F | HEIGHT: 60 IN | SYSTOLIC BLOOD PRESSURE: 113 MMHG | DIASTOLIC BLOOD PRESSURE: 65 MMHG | RESPIRATION RATE: 18 BRPM

## 2023-04-13 LAB
POCT GLUCOSE: 201 MG/DL (ref 70–110)
POCT GLUCOSE: 215 MG/DL (ref 70–110)

## 2023-04-13 PROCEDURE — 92507 TX SP LANG VOICE COMM INDIV: CPT | Mod: 59

## 2023-04-13 PROCEDURE — 27000221 HC OXYGEN, UP TO 24 HOURS

## 2023-04-13 PROCEDURE — 25000003 PHARM REV CODE 250: Performed by: INTERNAL MEDICINE

## 2023-04-13 PROCEDURE — 97110 THERAPEUTIC EXERCISES: CPT | Mod: 59,CQ

## 2023-04-13 PROCEDURE — 94760 N-INVAS EAR/PLS OXIMETRY 1: CPT

## 2023-04-13 PROCEDURE — 97116 GAIT TRAINING THERAPY: CPT | Mod: CQ

## 2023-04-13 PROCEDURE — G0378 HOSPITAL OBSERVATION PER HR: HCPCS

## 2023-04-13 PROCEDURE — 97530 THERAPEUTIC ACTIVITIES: CPT | Mod: 59

## 2023-04-13 PROCEDURE — 25000003 PHARM REV CODE 250: Performed by: STUDENT IN AN ORGANIZED HEALTH CARE EDUCATION/TRAINING PROGRAM

## 2023-04-13 RX ORDER — CLOPIDOGREL BISULFATE 75 MG/1
75 TABLET ORAL DAILY
Qty: 30 TABLET | Refills: 0 | Status: SHIPPED | OUTPATIENT
Start: 2023-04-14 | End: 2024-04-13

## 2023-04-13 RX ORDER — ASPIRIN 81 MG/1
81 TABLET ORAL DAILY
Qty: 30 TABLET | Refills: 11 | Status: SHIPPED | OUTPATIENT
Start: 2023-04-14 | End: 2024-04-13

## 2023-04-13 RX ORDER — ATORVASTATIN CALCIUM 40 MG/1
40 TABLET, FILM COATED ORAL DAILY
Qty: 90 TABLET | Refills: 3 | Status: SHIPPED | OUTPATIENT
Start: 2023-04-13 | End: 2024-04-12

## 2023-04-13 RX ADMIN — GLIPIZIDE 10 MG: 10 TABLET ORAL at 10:04

## 2023-04-13 RX ADMIN — SERTRALINE HYDROCHLORIDE 25 MG: 25 TABLET ORAL at 10:04

## 2023-04-13 RX ADMIN — METOPROLOL SUCCINATE 100 MG: 50 TABLET, FILM COATED, EXTENDED RELEASE ORAL at 10:04

## 2023-04-13 RX ADMIN — ASPIRIN 81 MG: 81 TABLET, COATED ORAL at 10:04

## 2023-04-13 RX ADMIN — SITAGLIPTIN 100 MG: 50 TABLET, FILM COATED ORAL at 10:04

## 2023-04-13 RX ADMIN — CLOPIDOGREL BISULFATE 75 MG: 75 TABLET ORAL at 10:04

## 2023-04-13 NOTE — PT/OT/SLP DISCHARGE
Occupational Therapy Discharge Summary    Yris Dunlap  MRN: [id number]   Principal Problem: TIA (transient ischemic attack)      Patient Discharged from acute Occupational Therapy on 4/13/2023.      Assessment:      Patient appropriate for care in another setting. Patient has not met goals.    Objective:     GOALS:   Multidisciplinary Problems       Occupational Therapy Goals          Problem: Occupational Therapy    Goal Priority Disciplines Outcome Interventions   Occupational Therapy Goal     OT, PT/OT Adequate for Care Transition    Description: Goals to be met by: 4/14/2023     Patient will increase functional independence with ADLs by performing:    Grooming while standing with Stand-by Assistance.  Toileting from toilet with Supervision for hygiene and clothing management.   Toilet transfer to toilet with Stand-by Assistance.                         Reasons for Discontinuation of Therapy Services  D/c home with home health services      Plan:     Patient Discharged to: Home with Home Health Service    4/13/2023

## 2023-04-13 NOTE — PROGRESS NOTES
Nutrition Education    Education Provided: diabetic diet and dysphagia diet  Teaching Method: explanation and printed materials  Comprehension: verbalizes understanding  Barriers to Learning: cognitive deficit and difficulty concentrating  Expected Compliance: good  Comments: All questions were answered and dietitian's contact information was provided.      Educated pt and family member on soft easy to chew diabetic diet. Nursing present as well. Pt getting discharged.

## 2023-04-13 NOTE — PLAN OF CARE
Received notification from  to order BSC and Home Health. BSC order sent to [facility] and Home Health order sent to [facility] via [employer].     - Spoke with Navarro at [facility] who stated insurance would not cover BSC.   -Informed Pt's daughter, Beckie, that BSC would not be covered and the cash price would be around $60. Beckie stated that her family could not purchase BSC at this time.   [facility] and  notified.

## 2023-04-13 NOTE — DISCHARGE SUMMARY
[facility] Medicine Discharge Summary    Admit Date: 4/10/2023  Discharge Date and Time: 4/13/202310:27 AM  Admitting Physician:  Team  Discharging Physician: Angeles Fink MD.  Primary Care Physician: No primary care provider on file.  Consults:     Discharge Diagnoses:  acute ischemic CVA-large left paramedian pontine  Hypertension  Hypercholesterolemia   Diabetes   Insomnia   Generalized anxiety disorder    Hospital Course:   68-year-old female with past medical history of traumatic brain injury, hypertension, diabetes mellitus, hyperlipidemia, gout who presents with complaint of dysarthria this morning.  Family at bedside reports last known normal was last night before she went to bed.  When she woke up patient experienced dizziness, vomiting and marked dysarthria.  At baseline family reports she speaks normally however today she has a staggered speech pattern.  Reportedly per ED physician Neurology was consulted and reported patient could remain at our facility if no large vessel occlusion which was ruled out by CTA.  MRI did not show evidence of acute ischemia.  Dysarthria remains.   At baseline patient is independent for ADLs  Repeat Mri brain was performed 4/11/2024 which did indeed show pontine left paramedian large acute nonhemorrhagic infarct.  She is aspirin naive at home.       April 12, 2023--Seen and examined this morning.  She is doing better working with therapy today.  Had difficult time getting to sleep last night was better after getting seroquel.  Updated her and daughter regarding results of MRI.     April 13, 2023, patient is doing well, no fever, no shortness of breath, dysarthria is slightly improved  No fever, no shortness of breath, patient can go home today and have a follow-up with the neurology     I gave the patient aspirin, Lipitor prescription for 12 month, patient can take Plavix for 1 month, and Neurology can decide medication regimen at  the follow-up clinic visit    Pt was seen and examined on the day of discharge  Vitals:  VITAL SIGNS: 24 HRS MIN & MAX LAST   Temp  Min: 97.3 °F (36.3 °C)  Max: 98 °F (36.7 °C) 97.8 °F (36.6 °C)   BP  Min: 124/68  Max: 151/77 127/69   Pulse  Min: 60  Max: 72  67   Resp  Min: 18  Max: 18 18   SpO2  Min: 87 %  Max: 97 % 97 %       Physical Exam:  Gen---alert and oriented times 3  Neck---supple  Lung---Bilaterally clear to auscutation  Heart---regular, S1,S2, no murmur, no gallop  Abd---soft,nontender, not distended,positive bowel sound  Ext---no cynosis,no clubbing,no edema  Neuro--dysarthria  Psych---cooperative     Procedures Performed: No admission procedures for hospital encounter.     Significant Diagnostic Studies: See Full reports for all details    Recent Labs   Lab 04/10/23  0815 04/11/23  0311 04/12/23  0603   WBC 9.9 12.2* 9.5   RBC 4.62 4.59 4.29   HGB 13.4 13.6 12.7   HCT 41.0 40.6 38.1   MCV 88.7 88.5 88.8   MCH 29.0 29.6 29.6   MCHC 32.7* 33.5 33.3   RDW 12.9 12.8 13.0    346 297   MPV 8.8 9.3 9.1       Recent Labs   Lab 04/10/23  0815 04/11/23  0311 04/12/23  0603    143 139   K 3.5 3.3* 3.3*   CO2 26 28 28   BUN 19.0 19.0 24.9*   CREATININE 1.01 0.84 0.77   CALCIUM 9.4 9.5 9.0   ALBUMIN 3.7  --   --    ALKPHOS 74  --   --    ALT 10  --   --    AST 10  --   --    BILITOT 0.5  --   --         Microbiology Results (last 7 days)       ** No results found for the last 168 hours. **             Echo  · The left ventricle is normal in size with normal systolic function.  · The estimated ejection fraction is 70%.  · Normal left ventricular diastolic function.  · Valvular structures are normal in appearance and function within study   limits.     MRI Brain Without Contrast  Narrative: EXAMINATION:  MRI BRAIN WITHOUT CONTRAST    CLINICAL HISTORY:  Dysarthria ;    TECHNIQUE:  Multiplanar MRI sequences were performed of the brain without contrast.    COMPARISON:  MRI brain April 10, 2023.  CT brain  without contrast April 10, 2023.    FINDINGS:  Dr. Swanson requested MRI brain without and with contrast.  Patient refused contrast administration.  Also coronal images of the brain could not be acquired.    There is pontine left paramedian large acute infarct which involves the entire anterior posterior diameter and shows intense restricted diffusion with signal dropout on the ADC map and T2 FLAIR hyperintensity.  This infarct is slightly more apparent in size since yesterday though remain non hemorrhagic.    There are moderate chronic microvascular ischemic changes with periventricular and deep white matter T2 FLAIR hyperintense signals.  Generalized cerebral cortical volume loss.  Gradient echo sequences demonstrate no evidence of de phasing artifact to suggest hemorrhagic byproducts.    The cerebellar tonsils are normally positioned. There is no acute intracranial hemorrhage, hydrocephalus, midline shift or mass effect. No acute extra axial fluid collections identified. The mastoid air cells are clear.  Impression: 1.  Pontine left paramedian large acute nonhemorrhagic infarct.    2.  Chronic microangiopathic ischemia and atrophy.    Electronically signed by: Fazal Melgar  Date:    04/11/2023  Time:    15:42         Medication List        START taking these medications      aspirin 81 MG EC tablet  Commonly known as: ECOTRIN  Take 1 tablet (81 mg total) by mouth once daily.  Start taking on: April 14, 2023     atorvastatin 40 MG tablet  Commonly known as: LIPITOR  Take 1 tablet (40 mg total) by mouth once daily.     clopidogreL 75 mg tablet  Commonly known as: PLAVIX  Take 1 tablet (75 mg total) by mouth once daily.  Start taking on: April 14, 2023            CONTINUE taking these medications      amLODIPine 5 MG tablet  Commonly known as: NORVASC     cetirizine 10 MG tablet  Commonly known as: ZYRTEC     glipiZIDE 10 MG tablet  Commonly known as: GLUCOTROL     hydroCHLOROthiazide 12.5 MG Tab  Commonly known as:  HYDRODIURIL     lisinopriL 5 MG tablet  Commonly known as: PRINIVIL,ZESTRIL     metoprolol succinate 100 MG 24 hr tablet  Commonly known as: TOPROL-XL     TRADJENTA 5 mg Tab tablet  Generic drug: linaGLIPtin            STOP taking these medications      pravastatin 40 MG tablet  Commonly known as: PRAVACHOL               Where to Get Your Medications        These medications were sent to [address] [address]      Phone: [phone]   aspirin 81 MG EC tablet  atorvastatin 40 MG tablet  clopidogreL 75 mg tablet          Explained in detail to the patient about the discharge plan, medications, and follow-up visits. Pt understands and agrees with the treatment plan  Discharge Disposition:     Discharged Condition: stable  Diet-   Dietary Orders (From admission, onward)       Start     Ordered    04/11/23 1018  Diet Easy to Chew  Diet effective now         04/11/23 1017                   Medications Per DC med rec  Activities as tolerated   Follow-up Information       GRACIELA Figueroa .    Specialty: Family Medicine  Contact information:  [address] [place] [address] [phone]               [facility], [facility] Follow up.    Why: BSC to be delivered before DC  Contact information:  [address]  [address]  [facility] [address]  [phone]             [facility] Follow up.    Specialties: Home Health Services, Home Therapy Services, Home Living Aide Services  Contact information:  [address]  [facility], [address]  [facility] [address]  [phone]                         For further questions contact hospitalist office    Discharge time 33 minutes    For worsening symptoms, chest pain, shortness of breath, increased abdominal pain, high grade fever, stroke or stroke like symptoms, immediately go to the nearest Emergency Room or call 911 as soon as  possible.      Angeles Penn M.D on 4/13/2023. at 10:27 AM.

## 2023-04-13 NOTE — PLAN OF CARE
Problem: SLP  Goal: SLP Goal  Description: LTG:  Pt will tolerate LRD w/o overt s/s of aspiration. Goal met  Pt will improve speech intelligibility to 95% at the conversation level.    STG:  Pt will complete speech drills x40 SBA.  Pt will utilize clear speech strategies at the sentence level w/ 90% speech intelligibility.  Pt will tolerate easy to chew diet w/ thin liquids w/ good oral clearance and w/o overt s/s of aspiration. Goal met  Outcome: Adequate for Care Transition

## 2023-04-13 NOTE — PLAN OF CARE
Problem: Adult Inpatient Plan of Care  Goal: Plan of Care Review  Outcome: Ongoing, Progressing  Flowsheets (Taken 4/13/2023 1023)  Plan of Care Reviewed With:   patient   daughter  Goal: Patient-Specific Goal (Individualized)  Outcome: Ongoing, Progressing  Goal: Absence of Hospital-Acquired Illness or Injury  Outcome: Ongoing, Progressing  Intervention: Identify and Manage Fall Risk  Flowsheets (Taken 4/13/2023 1023)  Safety Promotion/Fall Prevention:   assistive device/personal item within reach   bed alarm set   chair alarm set   gait belt with ambulation   in recliner, wheels locked   nonskid shoes/socks when out of bed   instructed to call staff for mobility  Intervention: Prevent Skin Injury  Flowsheets (Taken 4/13/2023 1023)  Body Position: position changed independently  Skin Protection: incontinence pads utilized  Intervention: Prevent and Manage VTE (Venous Thromboembolism) Risk  Flowsheets (Taken 4/13/2023 1023)  Activity Management:   Up in chair - L3   Walk with assistive devise and /or staff member - L3  VTE Prevention/Management:   bleeding precations maintained   bleeding risk assessed   fluids promoted   ambulation promoted  Range of Motion: active ROM (range of motion) encouraged  Intervention: Prevent Infection  Flowsheets (Taken 4/13/2023 1023)  Infection Prevention:   hand hygiene promoted   rest/sleep promoted   single patient room provided  Goal: Optimal Comfort and Wellbeing  Outcome: Ongoing, Progressing  Goal: Readiness for Transition of Care  Outcome: Ongoing, Progressing     Problem: Bariatric Environmental Safety  Goal: Safety Maintained with Care  Outcome: Ongoing, Progressing     Problem: Infection  Goal: Absence of Infection Signs and Symptoms  Outcome: Ongoing, Progressing     Problem: Fall Injury Risk  Goal: Absence of Fall and Fall-Related Injury  Outcome: Ongoing, Progressing     Problem: Hyperglycemia  Goal: Blood Glucose Level Within Targeted Range  Outcome: Ongoing,  Progressing

## 2023-04-13 NOTE — PT/OT/SLP PROGRESS
Speech Language Pathology Treatment    Patient Name:  Yris Dunlap   MRN:  [id number]  Admitting Diagnosis: TIA (transient ischemic attack)    Recommendations:                 General Recommendations:  Speech/language therapy  Diet recommendations:  Easy to Chew Diet - IDDSI Level 7, Liquid Diet Level: Thin liquids - IDDSI Level 0   Aspiration Precautions: HOB to 90 degrees and Standard aspiration precautions   General Precautions: Standard, fall  Communication strategies:  go to room if call light pushed    Subjective     Pt sitting in gerichair upon SLP arrival w/ daughter sitting on sofa. Pt pleasant and in good spirits.    Patient goals:      Pain/Comfort:       Respiratory Status: Room air    Objective:     Has the patient been evaluated by SLP for swallowing?   Yes  Keep patient NPO? No   Current Respiratory Status:        Pt completed rapid speech drills 4/x15 each Laura.  Pt participated in structured conversational tasks w/ 80-90% speech intelligibility. Increase in intelligibility noted w/ verbal cueing to utilize clear speech strategies. Most effect strategy is slow rate.    MD rounded during session and confirmed that pt will be discharging home today. SLP provided pt and pt's daughter w/ handout of clear speech strategies and speech drills to practice at home. Both verbalized understanding and w/ no Qs. SLP also reviewed easy to chew diet w/ pt's daughter.    Overall good session.  Cont SLP POC.    Assessment:     Yris Dunlap is a 68 y.o. female with an SLP diagnosis of Dysarthria.  She presents with dysarthric speech characterized by fast rate and imprecise articulation.    Goals:   Multidisciplinary Problems       SLP Goals          Problem: SLP    Goal Priority Disciplines Outcome   SLP Goal     SLP Ongoing, Progressing   Description: LTG:  Pt will tolerate LRD w/o overt s/s of aspiration.  Pt will improve speech intelligibility to 95% at the conversation level.    STG:  Pt will complete  speech drills x40 SBA.  Pt will utilize clear speech strategies at the sentence level w/ 90% speech intelligibility.  Pt will tolerate easy to chew diet w/ thin liquids w/ good oral clearance and w/o overt s/s of aspiration.                       Plan:     Patient to be seen:   (PRN)   Plan of Care expires:  04/21/23  Plan of Care reviewed with:  patient, grandchild(jose), daughter  SLP Follow-Up:  Yes       Discharge recommendations:      Barriers to Discharge:  Level of Skilled Assistance Needed see Pt/Ot notes    Time Tracking:     SLP Treatment Date:  4/13/23  Speech Start Time:  10:06  Speech Stop Time:  10:30     Speech Total Time (min):  24 minutes    Billable Minutes: Speech Therapy Individual 24    Chrissie Bourgeois M.S., CCC-SLP   04/13/2023

## 2023-04-13 NOTE — PLAN OF CARE
Problem: Occupational Therapy  Goal: Occupational Therapy Goal  Description: Goals to be met by: 4/14/2023     Patient will increase functional independence with ADLs by performing:    Grooming while standing with Stand-by Assistance.- not met  Toileting from toilet with Supervision for hygiene and clothing management. - not met  Toilet transfer to toilet with Stand-by Assistance.- not met    Outcome: Adequate for Care Transition

## 2023-04-13 NOTE — PT/OT/SLP DISCHARGE
Speech Language Pathology Discharge Summary    Yris Dunlap  MRN: [id number]   TIA (transient ischemic attack)     Date of Last Treatment Session: 4/12/23    Past Medical History:   Diagnosis Date    Diabetes mellitus     Gout, unspecified     Hypertension     Unspecified injury of head, initial encounter        Status at initiation of therapy: Pt NPO and dysarthric following TIA.    Treatment Area(s):  Motor Speech and Swallow    Goals:   Multidisciplinary Problems       SLP Goals          Problem: SLP    Goal Priority Disciplines Outcome   SLP Goal     SLP Adequate for Care Transition   Description: LTG:  Pt will tolerate LRD w/o overt s/s of aspiration. Goal met  Pt will improve speech intelligibility to 95% at the conversation level.    STG:  Pt will complete speech drills x40 SBA.  Pt will utilize clear speech strategies at the sentence level w/ 90% speech intelligibility.  Pt will tolerate easy to chew diet w/ thin liquids w/ good oral clearance and w/o overt s/s of aspiration. Goal met                       Participation in Treatment (at discharge):  Cooperative    Functional Status at time of Discharge:      Motor Speech: Patient demonstrates minimal motor speech deficits.    Swallow: Patient demonstrates no dysphagia.                     Clinical Bedside assessment was completed on 4/11/23             Education provided on day of discharge related to diet and dysarthria. See treatment note for details.                    Patient is discharged to Home                   Chrissie Bourgeois M.S., CCC-SLP

## 2023-04-13 NOTE — CARE UPDATE
04/13/23 0940   Home Oxygen Qualification   Room Air SpO2 At Rest 96 %   Room Air SpO2 During Ambulation 92 %   Heart Rate on O2 76 bpm   SpO2 Post Ambulation 96 %   Post Ambulation Heart Rate 75 bpm   Home O2 Eval Comments Pt tolrated ra with excercise with pt no sob or desaturation o2 levels mid 90's 97%.

## 2023-04-13 NOTE — DISCHARGE INSTRUCTIONS
Please follow all discharge instructions as given. KEEP ALL FOLLOW UP APPOINTMENTS!        If you experience any worsening or NEW signs or symptoms please call your primary care provider or head to your nearest emergency department.           THANK YOU FOR CHOOSING [facility].  If you have any questions please call [phone].

## 2023-04-13 NOTE — PT/OT/SLP PROGRESS
Occupational Therapy  Treatment    Name: Yris Dunlap    : 1955 (68 y.o.)  MRN: [id number]           TREATMENT SUMMARY AND RECOMMENDATIONS:      OT Date of Treatment: 23  OT Start Time: 935  OT Stop Time: 1007  OT Total Time (min): 32 min      Subjective Assessment:   No complaints  Lethargic   X Awake, alert, cooperative  Impulsive    Uncooperative   Flat affect    Agitated  c/o pain    Appropriate  c/o fatigue    Confused  Treated at bedside     Emotionally labile X Treated in gym/dept.      Other:        Therapy Precautions:    Cognitive deficits  Spinal precautions    Collar - hard  Sternal precautions    Collar - soft   TLSO   X Fall risk  LSO    Hip precautions - posterior  Knee immobilizer    Hip precautions - anterior  WBAT    Impaired communication  Partial weightbearing    Oxygen  TTWB    PEG tube  NWB    Visual deficits      Hearing deficits   Other:        Treatment Objectives:     Mobility Training:    Mobility task Assist level Comments    Bed mobility     Transfer Mod A Stand step t/f w/c>recliner   Sit to stands transitions     Functional mobility     Sitting balance     Standing balance      Other:              Therapeutic Ax:  Pt seated performed RUE FMC task incorporating pincer grasp, reach trajectory, intrinsic mm strength, in hand manipulation skills. Multiple drops observed and increased time to complete. Min v/cs and t/cs to prevent compensatory LUE use. Ax upgraded to incorporating categorical naming. Mod A for word finding.        Assessment: Patient tolerated session well. Pt would continue to benefit from skilled OT services to improve pt's safety and indpendence with self care tasks.    OT Plan: Continue OT POC.  Revisions made to plan of care: No    GOALS:   Multidisciplinary Problems       Occupational Therapy Goals          Problem: Occupational Therapy    Goal Priority Disciplines Outcome Interventions   Occupational Therapy Goal     OT, PT/OT Ongoing, Progressing     Description: Goals to be met by: 4/14/2023     Patient will increase functional independence with ADLs by performing:    Grooming while standing with Stand-by Assistance.  Toileting from toilet with Supervision for hygiene and clothing management.   Toilet transfer to toilet with Stand-by Assistance.                         Skilled OT Minutes Provided: 32  Communication with Treatment Team:     Equipment recommendations:       At end of treatment, patient remained:  X Up in chair     Up in wheelchair in room    In bed   X With alarm activated    Bed rails up   X Call bell in reach    X Family/friends present    Restraints secured properly    In bathroom with CNA/RN notified    In gym with PT/PTA/tech    Nurse aware    Other:

## 2023-04-13 NOTE — PT/OT/SLP PROGRESS
Physical Therapy Treatment Note           Name: Yris Dunlap    : 1955 (68 y.o.)  MRN: [id number]           TREATMENT SUMMARY AND RECOMMENDATIONS:    PT Received On: 23  PT Start Time: 900     PT Stop Time: 925  PT Total Time (min): 25 min     Subjective Assessment:  x No complaints  Lethargic    Awake, alert, cooperative  Uncooperative    Agitated  c/o pain    Appropriate  c/o fatigue    Confused  Treated at bedside     Emotionally labile  Treated in gym/dept.    Impulsive  Other:    Flat affect       Therapy Precautions:    Cognitive deficits  Spinal precautions    Collar - hard  Sternal precautions    Collar - soft   TLSO   x Fall risk  LSO    Hip precautions - posterior  Knee immobilizer    Hip precautions - anterior  WBAT    Impaired communication  Partial weightbearing    Oxygen  TTWB    PEG tube  NWB    Visual deficits  Other:    Hearing deficits          Treatment Objectives:     Mobility Training:   Assist level Comments    Bed mobility     Transfer     Gait Laura Amb on firm surface with RW 20 ft x 2   Sit to stand transitions Laura Sit-stand 5 reps x 2 with B UE use   Sitting balance     Standing balance      Wheelchair mobility     Car transfer     Other:          Therapeutic Exercise:   Exercise Sets Reps Comments   B LE exer sitting  20 reps  Ankle pumps, TKE, abd/add, knee lifts                          Additional Comments:  R side weakness- unsteady during mobility - safety awareness issues    Assessment: Patient tolerated session fair.    PT Plan: continue with safety awareness techniques  Revisions made to plan of care: No    GOALS:   Multidisciplinary Problems       Physical Therapy Goals          Problem: Physical Therapy    Goal Priority Disciplines Outcome Goal Variances Interventions   Physical Therapy Goal     PT, PT/OT Ongoing, Progressing     Description: Goals to be met by: Discharge     Patient will increase functional independence with mobility by  performin. Sit to stand transfer with Stand-by Assistance  2. Bed to chair transfer with Stand-by Assistance using Rolling Walker  3. Gait  x 100 feet with Stand-by Assistance using Rolling Walker.                          Skilled PT Minutes Provided: 25   Communication with Treatment Team:     Equipment recommendations:       At end of treatment, patient remained:   Up in chair     Up in wheelchair in room    In bed    With alarm activated    Bed rails up    Call bell in reach     Family/friends present    Restraints secured properly    In bathroom with CNA/RN notified    Nurse aware   x In gym with therapist/tech    Other:

## 2023-04-23 ENCOUNTER — HOSPITAL ENCOUNTER (EMERGENCY)
Facility: HOSPITAL | Age: 68
Discharge: HOME | End: 2023-04-23
Attending: EMERGENCY MEDICINE
Payer: MEDICARE

## 2023-04-23 VITALS
RESPIRATION RATE: 16 BRPM | HEIGHT: 65 IN | DIASTOLIC BLOOD PRESSURE: 91 MMHG | OXYGEN SATURATION: 93 % | SYSTOLIC BLOOD PRESSURE: 140 MMHG | TEMPERATURE: 97 F | HEART RATE: 67 BPM | BODY MASS INDEX: 29.16 KG/M2 | WEIGHT: 175 LBS

## 2023-04-23 DIAGNOSIS — B37.2 CANDIDAL INTERTRIGO: ICD-10-CM

## 2023-04-23 DIAGNOSIS — E11.65 TYPE 2 DIABETES MELLITUS WITH HYPERGLYCEMIA, WITHOUT LONG-TERM CURRENT USE OF INSULIN: Primary | ICD-10-CM

## 2023-04-23 LAB
ALBUMIN SERPL-MCNC: 3.8 G/DL (ref 3.4–4.8)
ALBUMIN/GLOB SERPL: 1.3 RATIO (ref 1.1–2)
ALP SERPL-CCNC: 95 UNIT/L (ref 40–150)
ALT SERPL-CCNC: 14 UNIT/L (ref 0–55)
APPEARANCE UR: CLEAR
AST SERPL-CCNC: 14 UNIT/L (ref 5–34)
BACTERIA #/AREA URNS AUTO: ABNORMAL /HPF
BASOPHILS # BLD AUTO: 0.07 X10(3)/MCL (ref 0–0.2)
BASOPHILS NFR BLD AUTO: 0.6 %
BILIRUB UR QL STRIP.AUTO: NEGATIVE MG/DL
BILIRUBIN DIRECT+TOT PNL SERPL-MCNC: 0.6 MG/DL
BUN SERPL-MCNC: 11.4 MG/DL (ref 9.8–20.1)
CALCIUM SERPL-MCNC: 9.6 MG/DL (ref 8.4–10.2)
CHLORIDE SERPL-SCNC: 98 MMOL/L (ref 98–107)
CO2 SERPL-SCNC: 30 MMOL/L (ref 23–31)
COLOR UR AUTO: YELLOW
CREAT SERPL-MCNC: 0.91 MG/DL (ref 0.55–1.02)
EOSINOPHIL # BLD AUTO: 0.57 X10(3)/MCL (ref 0–0.9)
EOSINOPHIL NFR BLD AUTO: 4.9 %
ERYTHROCYTE [DISTWIDTH] IN BLOOD BY AUTOMATED COUNT: 12.8 % (ref 11.5–17)
EST. AVERAGE GLUCOSE BLD GHB EST-MCNC: 203 MG/DL
GFR SERPLBLD CREATININE-BSD FMLA CKD-EPI: >60 MLS/MIN/1.73/M2
GLOBULIN SER-MCNC: 3 GM/DL (ref 2.4–3.5)
GLUCOSE SERPL-MCNC: 328 MG/DL (ref 82–115)
GLUCOSE UR QL STRIP.AUTO: ABNORMAL MG/DL
HBA1C MFR BLD: 8.7 %
HCT VFR BLD AUTO: 41.3 % (ref 37–47)
HGB BLD-MCNC: 13.8 G/DL (ref 12–16)
IMM GRANULOCYTES # BLD AUTO: 0.04 X10(3)/MCL (ref 0–0.04)
IMM GRANULOCYTES NFR BLD AUTO: 0.3 %
KETONES UR QL STRIP.AUTO: NEGATIVE MG/DL
LEUKOCYTE ESTERASE UR QL STRIP.AUTO: NEGATIVE UNIT/L
LYMPHOCYTES # BLD AUTO: 2.67 X10(3)/MCL (ref 0.6–4.6)
LYMPHOCYTES NFR BLD AUTO: 23 %
MCH RBC QN AUTO: 29.4 PG (ref 27–31)
MCHC RBC AUTO-ENTMCNC: 33.4 G/DL (ref 33–36)
MCV RBC AUTO: 87.9 FL (ref 80–94)
MONOCYTES # BLD AUTO: 0.55 X10(3)/MCL (ref 0.1–1.3)
MONOCYTES NFR BLD AUTO: 4.7 %
NEUTROPHILS # BLD AUTO: 7.73 X10(3)/MCL (ref 2.1–9.2)
NEUTROPHILS NFR BLD AUTO: 66.5 %
NITRITE UR QL STRIP.AUTO: NEGATIVE
NRBC BLD AUTO-RTO: 0 %
PH UR STRIP.AUTO: 7 [PH]
PLATELET # BLD AUTO: 370 X10(3)/MCL (ref 130–400)
PMV BLD AUTO: 9.8 FL (ref 7.4–10.4)
POCT GLUCOSE: 198 MG/DL (ref 70–110)
POTASSIUM SERPL-SCNC: 3.7 MMOL/L (ref 3.5–5.1)
PROT SERPL-MCNC: 6.8 GM/DL (ref 5.8–7.6)
PROT UR QL STRIP.AUTO: NEGATIVE MG/DL
RBC # BLD AUTO: 4.7 X10(6)/MCL (ref 4.2–5.4)
RBC #/AREA URNS AUTO: ABNORMAL /HPF
RBC UR QL AUTO: NEGATIVE UNIT/L
SODIUM SERPL-SCNC: 138 MMOL/L (ref 136–145)
SP GR UR STRIP.AUTO: 1.02 (ref 1–1.03)
SQUAMOUS #/AREA URNS AUTO: <5 /HPF
UROBILINOGEN UR STRIP-ACNC: 1 MG/DL
WBC # SPEC AUTO: 11.6 X10(3)/MCL (ref 4.5–11.5)
WBC #/AREA URNS AUTO: <5 /HPF

## 2023-04-23 PROCEDURE — 99284 EMERGENCY DEPT VISIT MOD MDM: CPT | Mod: 25

## 2023-04-23 PROCEDURE — 83036 HEMOGLOBIN GLYCOSYLATED A1C: CPT | Performed by: EMERGENCY MEDICINE

## 2023-04-23 PROCEDURE — 82962 GLUCOSE BLOOD TEST: CPT

## 2023-04-23 PROCEDURE — 81001 URINALYSIS AUTO W/SCOPE: CPT | Performed by: EMERGENCY MEDICINE

## 2023-04-23 PROCEDURE — 80053 COMPREHEN METABOLIC PANEL: CPT | Performed by: EMERGENCY MEDICINE

## 2023-04-23 PROCEDURE — 85025 COMPLETE CBC W/AUTO DIFF WBC: CPT | Performed by: EMERGENCY MEDICINE

## 2023-04-23 PROCEDURE — 25000003 PHARM REV CODE 250: Performed by: EMERGENCY MEDICINE

## 2023-04-23 PROCEDURE — 96360 HYDRATION IV INFUSION INIT: CPT

## 2023-04-23 RX ORDER — MICONAZOLE NITRATE 2 %
POWDER (GRAM) TOPICAL
Status: COMPLETED | OUTPATIENT
Start: 2023-04-23 | End: 2023-04-23

## 2023-04-23 RX ORDER — NYSTATIN 100000 [USP'U]/G
POWDER TOPICAL 4 TIMES DAILY
Qty: 60 G | Refills: 0 | Status: SHIPPED | OUTPATIENT
Start: 2023-04-23

## 2023-04-23 RX ADMIN — MICONAZOLE NITRATE: 20 POWDER TOPICAL at 11:04

## 2023-04-23 RX ADMIN — SODIUM CHLORIDE 1000 ML: 9 INJECTION, SOLUTION INTRAVENOUS at 09:04

## 2023-04-23 NOTE — PLAN OF CARE
[facility] - Medical Surgical Unit  Discharge Final Note    Primary Care Provider: No primary care provider on file.    Expected Discharge Date: 4/13/2023    Final Discharge Note (most recent)       Final Note - 04/23/23 1026          Final Note    Assessment Type Final Discharge Note     Anticipated Discharge Disposition Home-Health Care Svc     What phone number can be called within the next 1-3 days to see how you are doing after discharge? [phone]     Hospital Resources/Appts/Education Provided Provided patient/caregiver with written discharge plan information        Post-Acute Status    Post-Acute Authorization Home Health     Home Health Status Set-up Complete/Auth obtained     Discharge Delays None known at this time                     Important Message from Medicare             Contact Info       GRACIELA Figueroa   Specialty: Family Medicine    [address] [place] [address]   Phone: [phone]       Next Steps: Go to    Instructions: Follow up appointment on Tuesday, April 18, 2023 @ 2:00pm    [facility]   Specialty: Home Health Services, Home Therapy Services, Home Living Aide Services    [address], [address]  [facility] [address]   Phone: [phone]       Next Steps: Follow up

## 2023-04-23 NOTE — ED PROVIDER NOTES
Encounter Date: 4/23/2023       History     Chief Complaint   Patient presents with    Hyperglycemia     Family called EMS today for hyperglycemia; cbg 392 with ems; home health nurse states that pt may also have a UTI; pt denies any complaints     68-year-old female with a history of hypertension, diabetes mellitus presents to the emergency department for evaluation of hyperglycemia noted on fingerstick at home.  She states that she feels fine with the exception of some discomfort from a rash in the bilateral groin that has been present for several days.  She denies any fever or chills.  She was seen by [facility] recently and reportedly had a urinalysis concerning for possible urinary tract infection.  She does report that she has some discomfort with urination but mostly feels it on the skin surface.  She denies any abdominal pain.  She denies any nausea or vomiting, denied any chest pain or shortness of breath.    The history is provided by the patient and the EMS personnel.   General Illness   The current episode started today. The problem occurs continuously. The problem has been unchanged. Nothing relieves the symptoms. Nothing aggravates the symptoms. Associated symptoms include rash. Pertinent negatives include no fever, no abdominal pain, no nausea, no vomiting, no congestion and no shortness of breath.   Review of patient's allergies indicates:  No Known Allergies  Past Medical History:   Diagnosis Date    Diabetes mellitus     Gout, unspecified     Hypertension     Unspecified injury of head, initial encounter      No past surgical history on file.  No family history on file.  Social History     Substance Use Topics    Alcohol use: Never     Review of Systems   Constitutional:  Negative for fever.   HENT:  Negative for congestion.    Respiratory:  Negative for chest tightness and shortness of breath.    Cardiovascular:  Negative for chest pain.   Gastrointestinal:  Negative for abdominal pain, nausea  and vomiting.   Genitourinary:  Positive for dysuria.   Skin:  Positive for rash.     Physical Exam     Initial Vitals [04/23/23 0858]   BP Pulse Resp Temp SpO2   136/83 78 18 97.3 °F (36.3 °C) 99 %      MAP       --         Physical Exam    Nursing note and vitals reviewed.  Constitutional: She appears well-developed. No distress.   HENT:   Head: Normocephalic and atraumatic.   Mouth/Throat: Oropharynx is clear and moist.   Eyes: Pupils are equal, round, and reactive to light. No scleral icterus.   Cardiovascular:  Normal rate.           Pulmonary/Chest: Breath sounds normal. No respiratory distress.   Abdominal: Abdomen is soft. She exhibits no distension. There is no abdominal tenderness.     Neurological: She is alert and oriented to person, place, and time. GCS score is 15. GCS eye subscore is 4. GCS verbal subscore is 5. GCS motor subscore is 6.   Skin: Skin is warm and dry. Rash noted.   Bilateral inguinal crease and surrounding skin with start erythema, occasional satellite lesions, tender to palpation, no purulent drainage, no fluctuance or abscess     ED Course   Procedures  Labs Reviewed   COMPREHENSIVE METABOLIC PANEL - Abnormal; Notable for the following components:       Result Value    Glucose Level 328 (*)     All other components within normal limits   HEMOGLOBIN A1C - Abnormal; Notable for the following components:    Hemoglobin A1c 8.7 (*)     All other components within normal limits   URINALYSIS, REFLEX TO URINE CULTURE - Abnormal; Notable for the following components:    Glucose, UA 3+ (*)     All other components within normal limits   CBC WITH DIFFERENTIAL - Abnormal; Notable for the following components:    WBC 11.6 (*)     All other components within normal limits   URINALYSIS, MICROSCOPIC - Abnormal; Notable for the following components:    RBC, UA 11-20 (*)     All other components within normal limits   POCT GLUCOSE - Abnormal; Notable for the following components:    POCT Glucose 198  (*)     All other components within normal limits   CBC W/ AUTO DIFFERENTIAL    Narrative:     The following orders were created for panel order CBC auto differential.  Procedure                               Abnormality         Status                     ---------                               -----------         ------                     CBC with Differential[[id number]]        Abnormal            Final result                 Please view results for these tests on the individual orders.          Medical Decision Making  Problems Addressed:  Candidal intertrigo: acute illness or injury  Type 2 diabetes mellitus with hyperglycemia, without long-term current use of insulin: chronic illness or injury with exacerbation, progression, or side effects of treatment      ED assessment:    Ms. Dunlap presented for evaluation of hyperglycemia as well as an intertriginous rash in the bilateral inguinal crease.  Reportedly with possible urinary tract infection on outpatient laboratory studies.  She is otherwise asymptomatic at this time.  Reports compliance with her home medications.  Per my review of the records, she is not currently on an insulin regimen, oral hypoglycemics prescribed by her primary care physician.    Differential diagnosis (including but not limited to):   Uncontrolled diabetes mellitus, dietary indiscretion, missed medications, progressed diabetes mellitus with hyperglycemia, electrolyte derangements, acute kidney injury, dehydration, diabetic ketoacidosis, urinary tract infection, intertrigo with candidal infection    ED management:   See ED course below.  No indication of an acute hyperglycemic emergency.  After IV fluids, CBG is improving and is near her estimated average glucose based on her A1c.  I prescribed nystatin powder to help with the Candida intertrigo.  No indication of urinary tract infection on this visit.  I have discussed with the patient and her daughter (via phone call) to monitor  blood glucose and follow up with her primary care physician as may need medications adjusted if not improving once acute inflammatory process has resolved.      Amount and/or Complexity of Data Reviewed  Independent historian: EMS   Summary of history:  EMS reports family concern for hyperglycemia today, home health with recent urinalysis concerning for urinary tract infection.  They reports some foul odor to the urine.  External data reviewed: notes from previous admissions  Summary of data reviewed:  Recent admission for CVA which presented with dysarthria and dizziness.  Started on Plavix and Lipitor on the hospitalization.  Blood glucose during that hospital stay was running in the upper 200-300s range.  Risk and benefits of testing: discussed   Labs: ordered and reviewed      Risk  Prescription drug management   Shared decision making     Critical Care  none    I, Gloria Hickey MD personally performed the history, PE, MDM, and procedures as documented above and agree with the scribe's documentation.          Medications   sodium chloride 0.9% bolus 1,000 mL 1,000 mL (0 mLs Intravenous Stopped 4/23/23 1015)   miconazole NITRATE 2 % top powder ( Topical (Top) Given 4/23/23 1131)                 ED Course as of 04/23/23 1234   Sun Apr 23, 2023   0954 Hyperglycemia w/o anion gap acidosis. IV fluids initiated in ED and will reassess.  [KS]   1019 No indication of urinary tract infection at this time.  Glucosuria without ketonuria.  Will repeat CBG after IV fluids.   She does have intertrigo to the bilateral inguinal crease, likely to benefit from nystatin powder. [KS]   1123 Repeat . Will discharge home, instructed to continue home meds, monitor diet and follow up with PCP as may need adjustments to home medications. No indication of UTI at this time. Will start on topical nystatin powder on discharge to help with suspected candida intertrigo of the inguinal crease bilaterally. ED return precautions reviewed  at the bedside and provided in the written discharge instructions. All questions answered to the best of my ability.  [KS]   1232 The patient's daughter, Beckie, had called with concern that the patient had not been evaluated by a physician.  I contacted her on her phone and did inform her that I had evaluated her mother and gone over her results with her.  Answered all her questions to the best of my ability and recommended that she follow up closely with her primary care physician to determine if any further changes need to be made with her medications as per plan outlined above.   No indication for starting insulin from the ED as A1C < 10 - suspect glucose control will improve w/ improvement in intertriginous inflammatory process.  [KS]      ED Course User Index  [KS] Gloria Hickey MD                 Clinical Impression:   Final diagnoses:  [E11.65] Type 2 diabetes mellitus with hyperglycemia, without long-term current use of insulin (Primary)  [B37.2] Candidal intertrigo        ED Disposition Condition    Discharge Stable          ED Prescriptions       Medication Sig Dispense Start Date End Date Auth. Provider    nystatin (MYCOSTATIN) powder Apply topically 4 (four) times daily. 60 g 4/23/2023 -- Gloria Hickey MD          Follow-up Information       Follow up With Specialties Details Why Contact Info    [facility] Schedule an appointment as soon as possible for a visit   [address] [facility] [id number] [phone]      [facility] - Emergency Dept Emergency Medicine  As needed, If symptoms worsen [address] [facility] [id number] [phone]             Gloria Hickey MD  04/23/23 5721

## 2023-04-26 NOTE — PT/OT/SLP DISCHARGE
Physical Therapy Discharge Summary    Name: Yris Dunlap  MRN: [id number]   Principal Problem: TIA (transient ischemic attack)     Patient Discharged from acute Physical Therapy on 23.  Please refer to prior PT noted date on 23 for functional status.     Assessment:     Patient appropriate for care in another setting.    Objective:     GOALS:   Multidisciplinary Problems       Physical Therapy Goals          Problem: Physical Therapy    Goal Priority Disciplines Outcome Goal Variances Interventions   Physical Therapy Goal     PT, PT/OT Ongoing, Progressing     Description: Goals to be met by: Discharge     Patient will increase functional independence with mobility by performin. Sit to stand transfer with Stand-by Assistance  2. Bed to chair transfer with Stand-by Assistance using Rolling Walker  3. Gait  x 100 feet with Stand-by Assistance using Rolling Walker.                          Reasons for Discontinuation of Therapy Services  Transfer to alternate level of care.      Plan:     Patient Discharged to: Home with Home Health Service.      2023

## 2023-06-13 ENCOUNTER — HOSPITAL ENCOUNTER (EMERGENCY)
Facility: HOSPITAL | Age: 68
Discharge: HOME | End: 2023-06-14
Attending: SPECIALIST
Payer: MEDICARE

## 2023-06-13 DIAGNOSIS — E16.2 HYPOGLYCEMIA: Primary | ICD-10-CM

## 2023-06-13 DIAGNOSIS — T88.7XXA MEDICATION SIDE EFFECT: ICD-10-CM

## 2023-06-13 DIAGNOSIS — R42 DIZZINESS: ICD-10-CM

## 2023-06-13 LAB — POCT GLUCOSE: 68 MG/DL (ref 70–110)

## 2023-06-13 PROCEDURE — 99284 EMERGENCY DEPT VISIT MOD MDM: CPT | Mod: 25

## 2023-06-13 PROCEDURE — 82962 GLUCOSE BLOOD TEST: CPT

## 2023-06-14 VITALS
TEMPERATURE: 98 F | RESPIRATION RATE: 20 BRPM | SYSTOLIC BLOOD PRESSURE: 166 MMHG | WEIGHT: 177.5 LBS | DIASTOLIC BLOOD PRESSURE: 72 MMHG | BODY MASS INDEX: 34.85 KG/M2 | HEART RATE: 88 BPM | OXYGEN SATURATION: 100 % | HEIGHT: 60 IN

## 2023-06-14 LAB
ALBUMIN SERPL-MCNC: 3.9 G/DL (ref 3.4–4.8)
ALBUMIN/GLOB SERPL: 1.1 RATIO (ref 1.1–2)
ALP SERPL-CCNC: 88 UNIT/L (ref 40–150)
ALT SERPL-CCNC: 11 UNIT/L (ref 0–55)
AST SERPL-CCNC: 14 UNIT/L (ref 5–34)
BASOPHILS # BLD AUTO: 0.02 X10(3)/MCL
BASOPHILS NFR BLD AUTO: 0.2 %
BILIRUBIN DIRECT+TOT PNL SERPL-MCNC: 0.4 MG/DL
BUN SERPL-MCNC: 17.9 MG/DL (ref 9.8–20.1)
CALCIUM SERPL-MCNC: 9.6 MG/DL (ref 8.4–10.2)
CHLORIDE SERPL-SCNC: 102 MMOL/L (ref 98–107)
CO2 SERPL-SCNC: 27 MMOL/L (ref 23–31)
CREAT SERPL-MCNC: 0.85 MG/DL (ref 0.55–1.02)
EOSINOPHIL # BLD AUTO: 0.58 X10(3)/MCL (ref 0–0.9)
EOSINOPHIL NFR BLD AUTO: 4.5 %
ERYTHROCYTE [DISTWIDTH] IN BLOOD BY AUTOMATED COUNT: 13.1 % (ref 11.5–17)
GFR SERPLBLD CREATININE-BSD FMLA CKD-EPI: >60 MLS/MIN/1.73/M2
GLOBULIN SER-MCNC: 3.6 GM/DL (ref 2.4–3.5)
GLUCOSE SERPL-MCNC: 110 MG/DL (ref 82–115)
HCT VFR BLD AUTO: 40.4 % (ref 37–47)
HGB BLD-MCNC: 13.3 G/DL (ref 12–16)
LYMPHOCYTES # BLD AUTO: 3.85 X10(3)/MCL (ref 0.6–4.6)
LYMPHOCYTES NFR BLD AUTO: 30.1 %
MCH RBC QN AUTO: 28.7 PG (ref 27–31)
MCHC RBC AUTO-ENTMCNC: 32.9 G/DL (ref 33–36)
MCV RBC AUTO: 87.3 FL (ref 80–94)
MONOCYTES # BLD AUTO: 0.74 X10(3)/MCL (ref 0.1–1.3)
MONOCYTES NFR BLD AUTO: 5.8 %
NEUTROPHILS # BLD AUTO: 7.57 X10(3)/MCL (ref 2.1–9.2)
NEUTROPHILS NFR BLD AUTO: 59.2 %
PLATELET # BLD AUTO: 384 X10(3)/MCL (ref 130–400)
PMV BLD AUTO: 9.1 FL (ref 7.4–10.4)
POCT GLUCOSE: 228 MG/DL (ref 70–110)
POTASSIUM SERPL-SCNC: 3.4 MMOL/L (ref 3.5–5.1)
PROT SERPL-MCNC: 7.5 GM/DL (ref 5.8–7.6)
RBC # BLD AUTO: 4.63 X10(6)/MCL (ref 4.2–5.4)
SODIUM SERPL-SCNC: 143 MMOL/L (ref 136–145)
WBC # SPEC AUTO: 12.78 X10(3)/MCL (ref 4.5–11.5)

## 2023-06-14 PROCEDURE — 25000003 PHARM REV CODE 250: Performed by: SPECIALIST

## 2023-06-14 PROCEDURE — 80053 COMPREHEN METABOLIC PANEL: CPT | Performed by: SPECIALIST

## 2023-06-14 PROCEDURE — 63600175 PHARM REV CODE 636 W HCPCS: Performed by: SPECIALIST

## 2023-06-14 PROCEDURE — 82962 GLUCOSE BLOOD TEST: CPT

## 2023-06-14 PROCEDURE — 96360 HYDRATION IV INFUSION INIT: CPT

## 2023-06-14 PROCEDURE — 85025 COMPLETE CBC W/AUTO DIFF WBC: CPT | Performed by: SPECIALIST

## 2023-06-14 RX ORDER — DIPHENOXYLATE HYDROCHLORIDE AND ATROPINE SULFATE 2.5; .025 MG/1; MG/1
1 TABLET ORAL
Status: COMPLETED | OUTPATIENT
Start: 2023-06-14 | End: 2023-06-14

## 2023-06-14 RX ORDER — DEXTROSE MONOHYDRATE AND SODIUM CHLORIDE 5; .9 G/100ML; G/100ML
1000 INJECTION, SOLUTION INTRAVENOUS
Status: COMPLETED | OUTPATIENT
Start: 2023-06-14 | End: 2023-06-14

## 2023-06-14 RX ADMIN — DIPHENOXYLATE HYDROCHLORIDE AND ATROPINE SULFATE 1 TABLET: .025; 2.5 TABLET ORAL at 01:06

## 2023-06-14 RX ADMIN — DEXTROSE AND SODIUM CHLORIDE 1000 ML: 5; 900 INJECTION, SOLUTION INTRAVENOUS at 12:06

## 2023-06-14 NOTE — ED PROVIDER NOTES
Encounter Date: 6/13/2023       History     Chief Complaint   Patient presents with    Hypoglycemia     Pt into ED with reports of hypoglycemia. Pt's blood sugar was low earlier, she ate and it came up. Rechecked 30 min before arrival and blood sugar dropped again and it was 60.     Patient presents to the ER complaining of low blood sugar and dizziness; states her blood sugar was 60 at home; states she may have not eaten as much today; also notes loose bowel movements today but no fever, no blood in her stool    The history is provided by the patient.   Review of patient's allergies indicates:   Allergen Reactions    Codeine      Past Medical History:   Diagnosis Date    Diabetes mellitus     Gout, unspecified     Hypertension     Unspecified injury of head, initial encounter      No past surgical history on file.  No family history on file.  Social History     Substance Use Topics    Alcohol use: Never     Review of Systems   Constitutional: Negative.    HENT: Negative.     Respiratory: Negative.     Cardiovascular: Negative.    Gastrointestinal: Negative.    Musculoskeletal: Negative.    Skin: Negative.    Neurological:  Positive for weakness.   All other systems reviewed and are negative.    Physical Exam     Initial Vitals [06/13/23 2354]   BP Pulse Resp Temp SpO2   129/75 79 20 97.7 °F (36.5 °C) 95 %      MAP       --         Physical Exam    Nursing note and vitals reviewed.  Constitutional: She appears well-developed and well-nourished.   HENT:   Head: Normocephalic and atraumatic.   Eyes: EOM are normal. Pupils are equal, round, and reactive to light.   Neck: Neck supple.   Normal range of motion.  Cardiovascular:  Normal rate, regular rhythm, normal heart sounds and intact distal pulses.           Pulmonary/Chest: Breath sounds normal.   Abdominal: Abdomen is soft. Bowel sounds are normal. She exhibits no distension. There is no abdominal tenderness.   Musculoskeletal:         General: Normal range of  motion.      Cervical back: Normal range of motion and neck supple.     Neurological: She is alert and oriented to person, place, and time. She has normal strength. GCS score is 15. GCS eye subscore is 4. GCS verbal subscore is 5. GCS motor subscore is 6.   Skin: Skin is warm and dry.       ED Course   Procedures  Labs Reviewed   COMPREHENSIVE METABOLIC PANEL - Abnormal; Notable for the following components:       Result Value    Potassium Level 3.4 (*)     Globulin 3.6 (*)     All other components within normal limits   CBC WITH DIFFERENTIAL - Abnormal; Notable for the following components:    WBC 12.78 (*)     MCHC 32.9 (*)     All other components within normal limits   POCT GLUCOSE - Abnormal; Notable for the following components:    POCT Glucose 68 (*)     All other components within normal limits   POCT GLUCOSE - Abnormal; Notable for the following components:    POCT Glucose 228 (*)     All other components within normal limits   CBC W/ AUTO DIFFERENTIAL    Narrative:     The following orders were created for panel order CBC auto differential.  Procedure                               Abnormality         Status                     ---------                               -----------         ------                     CBC with Differential[[id number]]        Abnormal            Final result                 Please view results for these tests on the individual orders.          Imaging Results    None          Medications   dextrose 5 % and 0.9 % NaCl infusion (0 mLs Intravenous Stopped 6/14/23 0122)   diphenoxylate-atropine 2.5-0.025 mg per tablet 1 tablet (1 tablet Oral Given 6/14/23 0125)     Medical Decision Making:   History:   Old Medical Records: I decided to obtain old medical records.  Initial Assessment:   Patient with low blood sugar and dizziness; she takes Lantus as well as glipizide 10 mg twice a day  Differential Diagnosis:   Hypoglycemia, medication side-effect  Clinical Tests:   Lab Tests: Ordered  and Reviewed  The following lab test(s) were unremarkable: CBC and CMP  ED Management:  Patient was given D5 normal saline 500 mL; she was also given Lomotil x1 for her loose bowel movements; patient's blood sugar has improved significantly; she was also given a meal prior to discharge; patient feels much improved and no longer has any dizziness; patient instructed to discontinue glipizide until she follows up with her primary care physician           ED Course as of 06/14/23 0233 Wed Jun 14, 2023 0049 POCT Glucose(!): 68 [DD]   0231 POCT Glucose(!): 228 [DD]   0231 Glucose: 110 [DD]      ED Course User Index  [DD] Bam Fajardo MD        Patient Vitals for the past 24 hrs:   BP Temp Temp src Pulse Resp SpO2 Height Weight   06/14/23 0133 (!) 166/72 -- -- 88 -- 100 % -- --   06/13/23 2354 129/75 97.7 °F (36.5 °C) Oral 79 20 95 % 5' (1.524 m) 80.5 kg (177 lb 8 oz)   The patient is resting comfortably and in no acute distress.  She states that her symptoms have improved after treatment in Emergency Department. I personally discussed her test results and treatment plan.  Gave strict ED precautions.  Specific conditions for return to the emergency department and importance of follow up with her primary care provided or the physician listed on the discharge instructions.  Patient voices understanding and agrees to the plan discussed. All patients' questions have been answered at this time.   She has remained hemodynamically stable throughout entire stay in ED and is stable for discharge home.            Clinical Impression:   Final diagnoses:  [E16.2] Hypoglycemia (Primary)  [T88.7XXA] Medication side effect  [R42] Dizziness        ED Disposition Condition    Discharge Stable          ED Prescriptions    None       Follow-up Information       Follow up With Specialties Details Why Contact Info    [facility] In 2 days  [address] [facility]  [address]  [phone]               Bam Fajardo MD  06/14/23 1196

## 2023-07-10 PROBLEM — G45.9 TIA (TRANSIENT ISCHEMIC ATTACK): Status: RESOLVED | Noted: 2023-04-10 | Resolved: 2023-07-10
